# Patient Record
Sex: FEMALE | Race: WHITE | HISPANIC OR LATINO | URBAN - METROPOLITAN AREA
[De-identification: names, ages, dates, MRNs, and addresses within clinical notes are randomized per-mention and may not be internally consistent; named-entity substitution may affect disease eponyms.]

---

## 2017-04-19 ENCOUNTER — OUTPATIENT (OUTPATIENT)
Dept: OUTPATIENT SERVICES | Facility: HOSPITAL | Age: 57
LOS: 1 days | End: 2017-04-19
Payer: COMMERCIAL

## 2017-04-19 VITALS
RESPIRATION RATE: 16 BRPM | HEART RATE: 82 BPM | SYSTOLIC BLOOD PRESSURE: 168 MMHG | WEIGHT: 263.89 LBS | DIASTOLIC BLOOD PRESSURE: 87 MMHG | HEIGHT: 66 IN | TEMPERATURE: 98 F

## 2017-04-19 DIAGNOSIS — Z98.890 OTHER SPECIFIED POSTPROCEDURAL STATES: Chronic | ICD-10-CM

## 2017-04-19 DIAGNOSIS — N85.00 ENDOMETRIAL HYPERPLASIA, UNSPECIFIED: ICD-10-CM

## 2017-04-19 DIAGNOSIS — Z90.49 ACQUIRED ABSENCE OF OTHER SPECIFIED PARTS OF DIGESTIVE TRACT: Chronic | ICD-10-CM

## 2017-04-19 DIAGNOSIS — Z41.9 ENCOUNTER FOR PROCEDURE FOR PURPOSES OTHER THAN REMEDYING HEALTH STATE, UNSPECIFIED: Chronic | ICD-10-CM

## 2017-04-19 DIAGNOSIS — N95.0 POSTMENOPAUSAL BLEEDING: ICD-10-CM

## 2017-04-19 DIAGNOSIS — Z01.818 ENCOUNTER FOR OTHER PREPROCEDURAL EXAMINATION: ICD-10-CM

## 2017-04-19 DIAGNOSIS — Z98.89 OTHER SPECIFIED POSTPROCEDURAL STATES: Chronic | ICD-10-CM

## 2017-04-19 DIAGNOSIS — E11.9 TYPE 2 DIABETES MELLITUS WITHOUT COMPLICATIONS: ICD-10-CM

## 2017-04-19 LAB
ALBUMIN SERPL ELPH-MCNC: 2.2 G/DL — LOW (ref 3.3–5)
ALP SERPL-CCNC: 155 U/L — HIGH (ref 40–120)
ALT FLD-CCNC: 20 U/L — SIGNIFICANT CHANGE UP (ref 12–78)
ANION GAP SERPL CALC-SCNC: 8 MMOL/L — SIGNIFICANT CHANGE UP (ref 5–17)
AST SERPL-CCNC: 23 U/L — SIGNIFICANT CHANGE UP (ref 15–37)
BILIRUB SERPL-MCNC: 0.2 MG/DL — SIGNIFICANT CHANGE UP (ref 0.2–1.2)
BUN SERPL-MCNC: 23 MG/DL — SIGNIFICANT CHANGE UP (ref 7–23)
CALCIUM SERPL-MCNC: 8.3 MG/DL — LOW (ref 8.5–10.1)
CHLORIDE SERPL-SCNC: 109 MMOL/L — HIGH (ref 96–108)
CO2 SERPL-SCNC: 25 MMOL/L — SIGNIFICANT CHANGE UP (ref 22–31)
CREAT SERPL-MCNC: 1.1 MG/DL — SIGNIFICANT CHANGE UP (ref 0.5–1.3)
GLUCOSE SERPL-MCNC: 178 MG/DL — HIGH (ref 70–99)
HCT VFR BLD CALC: 38.6 % — SIGNIFICANT CHANGE UP (ref 34.5–45)
HGB BLD-MCNC: 11.8 G/DL — SIGNIFICANT CHANGE UP (ref 11.5–15.5)
MCHC RBC-ENTMCNC: 25.4 PG — LOW (ref 27–34)
MCHC RBC-ENTMCNC: 30.5 GM/DL — LOW (ref 32–36)
MCV RBC AUTO: 83.4 FL — SIGNIFICANT CHANGE UP (ref 80–100)
PLATELET # BLD AUTO: 307 K/UL — SIGNIFICANT CHANGE UP (ref 150–400)
POTASSIUM SERPL-MCNC: 4.4 MMOL/L — SIGNIFICANT CHANGE UP (ref 3.5–5.3)
POTASSIUM SERPL-SCNC: 4.4 MMOL/L — SIGNIFICANT CHANGE UP (ref 3.5–5.3)
PROT SERPL-MCNC: 6.2 G/DL — SIGNIFICANT CHANGE UP (ref 6–8.3)
RBC # BLD: 4.62 M/UL — SIGNIFICANT CHANGE UP (ref 3.8–5.2)
RBC # FLD: 13.6 % — SIGNIFICANT CHANGE UP (ref 10.3–14.5)
SODIUM SERPL-SCNC: 142 MMOL/L — SIGNIFICANT CHANGE UP (ref 135–145)
WBC # BLD: 7.8 K/UL — SIGNIFICANT CHANGE UP (ref 3.8–10.5)
WBC # FLD AUTO: 7.8 K/UL — SIGNIFICANT CHANGE UP (ref 3.8–10.5)

## 2017-04-19 PROCEDURE — G0463: CPT

## 2017-04-19 PROCEDURE — 80053 COMPREHEN METABOLIC PANEL: CPT

## 2017-04-19 PROCEDURE — 86901 BLOOD TYPING SEROLOGIC RH(D): CPT

## 2017-04-19 PROCEDURE — 83036 HEMOGLOBIN GLYCOSYLATED A1C: CPT

## 2017-04-19 PROCEDURE — 93005 ELECTROCARDIOGRAM TRACING: CPT

## 2017-04-19 PROCEDURE — 86850 RBC ANTIBODY SCREEN: CPT

## 2017-04-19 PROCEDURE — 86900 BLOOD TYPING SEROLOGIC ABO: CPT

## 2017-04-19 PROCEDURE — 93010 ELECTROCARDIOGRAM REPORT: CPT | Mod: NC

## 2017-04-19 PROCEDURE — 85027 COMPLETE CBC AUTOMATED: CPT

## 2017-04-19 RX ORDER — DEXTROSE 50 % IN WATER 50 %
25 SYRINGE (ML) INTRAVENOUS ONCE
Qty: 0 | Refills: 0 | Status: DISCONTINUED | OUTPATIENT
Start: 2017-04-28 | End: 2017-05-13

## 2017-04-19 RX ORDER — DEXTROSE 50 % IN WATER 50 %
1 SYRINGE (ML) INTRAVENOUS ONCE
Qty: 0 | Refills: 0 | Status: DISCONTINUED | OUTPATIENT
Start: 2017-04-28 | End: 2017-05-13

## 2017-04-19 RX ORDER — DEXTROSE 50 % IN WATER 50 %
12.5 SYRINGE (ML) INTRAVENOUS ONCE
Qty: 0 | Refills: 0 | Status: DISCONTINUED | OUTPATIENT
Start: 2017-04-28 | End: 2017-05-13

## 2017-04-19 RX ORDER — GLUCAGON INJECTION, SOLUTION 0.5 MG/.1ML
1 INJECTION, SOLUTION SUBCUTANEOUS ONCE
Qty: 0 | Refills: 0 | Status: DISCONTINUED | OUTPATIENT
Start: 2017-04-28 | End: 2017-05-13

## 2017-04-19 RX ORDER — SODIUM CHLORIDE 9 MG/ML
1000 INJECTION, SOLUTION INTRAVENOUS
Qty: 0 | Refills: 0 | Status: DISCONTINUED | OUTPATIENT
Start: 2017-04-28 | End: 2017-05-13

## 2017-04-19 NOTE — H&P PST ADULT - PMH
Endometrial hyperplasia, unspecified    Postmenopausal bleeding    Type 2 diabetes mellitus Endometrial hyperplasia, unspecified    Hypertension    Postmenopausal bleeding    Type 2 diabetes mellitus

## 2017-04-19 NOTE — H&P PST ADULT - PROBLEM SELECTOR PLAN 3
Pt does not have Endocrine Doctor - notes Dr Sylvester manages her Diabetes - notes Diagnosed about 15 years ago - does not know her A1C - notes has never known her A1C - does note she checks AM blood glucose levels and generally runs between 120-140 - Diabetes Wellness info given to pt for review - explained to pt she may need to see Endo prior to procedure if A1C > 9 - pt instructed to HOLD Metformin night prior to procedure on 4/27 as well as to HOLD Metformin & Glimepride morning of procedure on 4/28/17 - pt verbalizes understanding of all instructions

## 2017-04-19 NOTE — H&P PST ADULT - HISTORY OF PRESENT ILLNESS
56 year old female presents for PST prior to  Dilation & Curettage Hysteroscopy with Dr Garsia on 4/28/17 - pt notes 56 year old female   presents for PST prior to  Dilation & Curettage Hysteroscopy with Dr Garsia on 17 - pt notes she had stopped menses about 10 years ago however recently she developed some postmenopausal bleeding which lasted a few days. Pt notes she had Transvaginal Sonogram done in the office with endometrial biopsy attempted 2 times and pt notes "she couldn't get enough so I have to have this procedure." Pt is electing to scheduled procedure.

## 2017-04-19 NOTE — H&P PST ADULT - ASSESSMENT
56 year old female with Postmenopausal Bleeding and Endometrial Hyperplasia, Unspecified - scheduled for Dilation & Curettage Hysteroscopy with Dr Garsia on 4/28/17

## 2017-04-19 NOTE — H&P PST ADULT - NEGATIVE ENMT SYMPTOMS
no sinus symptoms/no hearing difficulty/no nasal discharge/no vertigo/no nasal obstruction/no nasal congestion

## 2017-04-19 NOTE — H&P PST ADULT - PSH
Elective surgery  Ovarian Cystectomy  H/O colonoscopy    H/O endoscopy    S/P laparoscopic cholecystectomy

## 2017-04-20 LAB — HBA1C BLD-MCNC: 12.2 % — HIGH (ref 4–5.6)

## 2017-04-27 ENCOUNTER — RESULT REVIEW (OUTPATIENT)
Age: 57
End: 2017-04-27

## 2017-04-27 RX ORDER — SODIUM CHLORIDE 9 MG/ML
1000 INJECTION, SOLUTION INTRAVENOUS
Qty: 0 | Refills: 0 | Status: DISCONTINUED | OUTPATIENT
Start: 2017-04-28 | End: 2017-05-13

## 2017-04-27 RX ORDER — ONDANSETRON 8 MG/1
4 TABLET, FILM COATED ORAL ONCE
Qty: 0 | Refills: 0 | Status: DISCONTINUED | OUTPATIENT
Start: 2017-04-28 | End: 2017-05-13

## 2017-04-28 ENCOUNTER — OUTPATIENT (OUTPATIENT)
Dept: OUTPATIENT SERVICES | Facility: HOSPITAL | Age: 57
LOS: 1 days | Discharge: HOME | End: 2017-04-28
Payer: COMMERCIAL

## 2017-04-28 ENCOUNTER — TRANSCRIPTION ENCOUNTER (OUTPATIENT)
Age: 57
End: 2017-04-28

## 2017-04-28 VITALS
OXYGEN SATURATION: 95 % | DIASTOLIC BLOOD PRESSURE: 81 MMHG | SYSTOLIC BLOOD PRESSURE: 145 MMHG | RESPIRATION RATE: 17 BRPM | HEART RATE: 70 BPM

## 2017-04-28 VITALS
RESPIRATION RATE: 21 BRPM | DIASTOLIC BLOOD PRESSURE: 74 MMHG | TEMPERATURE: 98 F | WEIGHT: 263.89 LBS | HEIGHT: 66 IN | SYSTOLIC BLOOD PRESSURE: 151 MMHG | HEART RATE: 81 BPM | OXYGEN SATURATION: 96 %

## 2017-04-28 DIAGNOSIS — Z41.9 ENCOUNTER FOR PROCEDURE FOR PURPOSES OTHER THAN REMEDYING HEALTH STATE, UNSPECIFIED: Chronic | ICD-10-CM

## 2017-04-28 DIAGNOSIS — Z90.49 ACQUIRED ABSENCE OF OTHER SPECIFIED PARTS OF DIGESTIVE TRACT: Chronic | ICD-10-CM

## 2017-04-28 DIAGNOSIS — N95.0 POSTMENOPAUSAL BLEEDING: ICD-10-CM

## 2017-04-28 DIAGNOSIS — Z98.89 OTHER SPECIFIED POSTPROCEDURAL STATES: Chronic | ICD-10-CM

## 2017-04-28 DIAGNOSIS — Z98.890 OTHER SPECIFIED POSTPROCEDURAL STATES: Chronic | ICD-10-CM

## 2017-04-28 DIAGNOSIS — N85.00 ENDOMETRIAL HYPERPLASIA, UNSPECIFIED: ICD-10-CM

## 2017-04-28 DIAGNOSIS — Z01.818 ENCOUNTER FOR OTHER PREPROCEDURAL EXAMINATION: ICD-10-CM

## 2017-04-28 PROCEDURE — 58558 HYSTEROSCOPY BIOPSY: CPT

## 2017-04-28 PROCEDURE — 88305 TISSUE EXAM BY PATHOLOGIST: CPT

## 2017-04-28 PROCEDURE — 88305 TISSUE EXAM BY PATHOLOGIST: CPT | Mod: 26

## 2017-04-28 RX ORDER — HYDROMORPHONE HYDROCHLORIDE 2 MG/ML
0.5 INJECTION INTRAMUSCULAR; INTRAVENOUS; SUBCUTANEOUS
Qty: 0 | Refills: 0 | Status: DISCONTINUED | OUTPATIENT
Start: 2017-04-28 | End: 2017-04-28

## 2017-04-28 RX ORDER — SODIUM CHLORIDE 9 MG/ML
1000 INJECTION, SOLUTION INTRAVENOUS
Qty: 0 | Refills: 0 | Status: DISCONTINUED | OUTPATIENT
Start: 2017-04-28 | End: 2017-04-28

## 2017-04-28 RX ORDER — OXYCODONE HYDROCHLORIDE 5 MG/1
5 TABLET ORAL ONCE
Qty: 0 | Refills: 0 | Status: DISCONTINUED | OUTPATIENT
Start: 2017-04-28 | End: 2017-04-28

## 2017-04-28 RX ORDER — ACETAMINOPHEN 500 MG
650 TABLET ORAL ONCE
Qty: 0 | Refills: 0 | Status: COMPLETED | OUTPATIENT
Start: 2017-04-28 | End: 2017-04-28

## 2017-04-28 RX ADMIN — SODIUM CHLORIDE 75 MILLILITER(S): 9 INJECTION, SOLUTION INTRAVENOUS at 07:38

## 2017-04-28 RX ADMIN — Medication 650 MILLIGRAM(S): at 07:37

## 2017-04-28 RX ADMIN — HYDROMORPHONE HYDROCHLORIDE 0.5 MILLIGRAM(S): 2 INJECTION INTRAMUSCULAR; INTRAVENOUS; SUBCUTANEOUS at 11:07

## 2017-04-28 RX ADMIN — HYDROMORPHONE HYDROCHLORIDE 0.5 MILLIGRAM(S): 2 INJECTION INTRAMUSCULAR; INTRAVENOUS; SUBCUTANEOUS at 10:47

## 2017-04-28 RX ADMIN — SODIUM CHLORIDE 75 MILLILITER(S): 9 INJECTION, SOLUTION INTRAVENOUS at 10:48

## 2017-04-28 NOTE — ASU DISCHARGE PLAN (ADULT/PEDIATRIC). - ACTIVITY LEVEL
no sports/gym/no heavy lifting/no intercourse/no tampons/no douching/nothing per vagina/no tub baths/no exercise

## 2017-04-28 NOTE — ASU DISCHARGE PLAN (ADULT/PEDIATRIC). - MEDICATION SUMMARY - MEDICATIONS TO TAKE
I will START or STAY ON the medications listed below when I get home from the hospital:    Motrin 600 mg oral tablet  -- 1 tab(s) by mouth every 8 hours x 3 days   -- Indication: For Pain med    glimepiride 4 mg oral tablet  -- 1 tab(s) by mouth once a day - IN AM  -- Indication: For home med    metFORMIN 500 mg oral tablet  -- 1 tab(s) by mouth 2 times a day  -- Indication: For home med    metoprolol succinate 25 mg oral tablet, extended release  -- 1 tab(s) by mouth once a day -IN AM  -- Indication: For home med    amLODIPine 10 mg oral tablet  -- 1 tab(s) by mouth once a day - IN AM  -- Indication: For HOME MED

## 2017-04-28 NOTE — ASU DISCHARGE PLAN (ADULT/PEDIATRIC). - NOTIFY
Bleeding that does not stop/Pain not relieved by Medications/Fever greater than 101/Persistent Nausea and Vomiting/Unable to Urinate

## 2017-04-28 NOTE — ASU PATIENT PROFILE, ADULT - PMH
Endometrial hyperplasia, unspecified    Hypertension    Postmenopausal bleeding    Type 2 diabetes mellitus

## 2017-04-28 NOTE — BRIEF OPERATIVE NOTE - ASSISTANT(S)
No intercourse/douching/tampons for 6 weeks. Avoid strenuous activity, exercise, heavy lifting for two weeks. You may shower freely, but do not soak in the tub or swim. Eat according to your appetite. Please follow all written and verbal instructions, and call us if you are having any acute problems, including (but not limited to) excessive vaginal bleeding (soaking pads), fever >100 degrees, persistent nausea or vomiting,  No Heavy lifting/straining, Do not drive or operate machinery, Showering allowed, Walking-Indoors allowed, Walking-Outdoors allowed, Stairs allowed

## 2017-05-03 DIAGNOSIS — E11.9 TYPE 2 DIABETES MELLITUS WITHOUT COMPLICATIONS: ICD-10-CM

## 2017-05-03 DIAGNOSIS — E66.9 OBESITY, UNSPECIFIED: ICD-10-CM

## 2017-05-03 DIAGNOSIS — I10 ESSENTIAL (PRIMARY) HYPERTENSION: ICD-10-CM

## 2017-05-03 DIAGNOSIS — Z88.0 ALLERGY STATUS TO PENICILLIN: ICD-10-CM

## 2017-05-03 DIAGNOSIS — N88.2 STRICTURE AND STENOSIS OF CERVIX UTERI: ICD-10-CM

## 2017-05-03 DIAGNOSIS — N95.0 POSTMENOPAUSAL BLEEDING: ICD-10-CM

## 2017-05-03 DIAGNOSIS — N84.0 POLYP OF CORPUS UTERI: ICD-10-CM

## 2017-05-03 DIAGNOSIS — R93.8 ABNORMAL FINDINGS ON DIAGNOSTIC IMAGING OF OTHER SPECIFIED BODY STRUCTURES: ICD-10-CM

## 2017-06-25 ENCOUNTER — INPATIENT (INPATIENT)
Facility: HOSPITAL | Age: 57
LOS: 1 days | Discharge: HOME | DRG: 638 | End: 2017-06-27
Attending: FAMILY MEDICINE | Admitting: FAMILY MEDICINE
Payer: COMMERCIAL

## 2017-06-25 VITALS
OXYGEN SATURATION: 96 % | WEIGHT: 259.93 LBS | TEMPERATURE: 98 F | RESPIRATION RATE: 16 BRPM | SYSTOLIC BLOOD PRESSURE: 164 MMHG | HEART RATE: 91 BPM | DIASTOLIC BLOOD PRESSURE: 80 MMHG | HEIGHT: 66 IN

## 2017-06-25 DIAGNOSIS — Z90.49 ACQUIRED ABSENCE OF OTHER SPECIFIED PARTS OF DIGESTIVE TRACT: Chronic | ICD-10-CM

## 2017-06-25 DIAGNOSIS — Z98.890 OTHER SPECIFIED POSTPROCEDURAL STATES: Chronic | ICD-10-CM

## 2017-06-25 DIAGNOSIS — E11.69 TYPE 2 DIABETES MELLITUS WITH OTHER SPECIFIED COMPLICATION: ICD-10-CM

## 2017-06-25 DIAGNOSIS — N18.2 CHRONIC KIDNEY DISEASE, STAGE 2 (MILD): ICD-10-CM

## 2017-06-25 DIAGNOSIS — Z98.89 OTHER SPECIFIED POSTPROCEDURAL STATES: Chronic | ICD-10-CM

## 2017-06-25 DIAGNOSIS — E11.9 TYPE 2 DIABETES MELLITUS WITHOUT COMPLICATIONS: ICD-10-CM

## 2017-06-25 DIAGNOSIS — Z41.9 ENCOUNTER FOR PROCEDURE FOR PURPOSES OTHER THAN REMEDYING HEALTH STATE, UNSPECIFIED: Chronic | ICD-10-CM

## 2017-06-25 DIAGNOSIS — I10 ESSENTIAL (PRIMARY) HYPERTENSION: ICD-10-CM

## 2017-06-25 LAB
ALBUMIN SERPL ELPH-MCNC: 1.9 G/DL — LOW (ref 3.3–5)
ALP SERPL-CCNC: 137 U/L — HIGH (ref 40–120)
ALT FLD-CCNC: 10 U/L — LOW (ref 12–78)
ANION GAP SERPL CALC-SCNC: 6 MMOL/L — SIGNIFICANT CHANGE UP (ref 5–17)
AST SERPL-CCNC: 15 U/L — SIGNIFICANT CHANGE UP (ref 15–37)
BASOPHILS # BLD AUTO: 0.1 K/UL — SIGNIFICANT CHANGE UP (ref 0–0.2)
BASOPHILS NFR BLD AUTO: 0.5 % — SIGNIFICANT CHANGE UP (ref 0–2)
BILIRUB SERPL-MCNC: 0.1 MG/DL — LOW (ref 0.2–1.2)
BUN SERPL-MCNC: 33 MG/DL — HIGH (ref 7–23)
CALCIUM SERPL-MCNC: 8.2 MG/DL — LOW (ref 8.5–10.1)
CHLORIDE SERPL-SCNC: 108 MMOL/L — SIGNIFICANT CHANGE UP (ref 96–108)
CO2 SERPL-SCNC: 25 MMOL/L — SIGNIFICANT CHANGE UP (ref 22–31)
CREAT SERPL-MCNC: 1.5 MG/DL — HIGH (ref 0.5–1.3)
EOSINOPHIL # BLD AUTO: 0.1 K/UL — SIGNIFICANT CHANGE UP (ref 0–0.5)
EOSINOPHIL NFR BLD AUTO: 1.1 % — SIGNIFICANT CHANGE UP (ref 0–6)
ERYTHROCYTE [SEDIMENTATION RATE] IN BLOOD: 102 MM/HR — HIGH (ref 0–20)
GLUCOSE SERPL-MCNC: 261 MG/DL — HIGH (ref 70–99)
HCT VFR BLD CALC: 34 % — LOW (ref 34.5–45)
HGB BLD-MCNC: 11 G/DL — LOW (ref 11.5–15.5)
LACTATE SERPL-SCNC: 1.1 MMOL/L — SIGNIFICANT CHANGE UP (ref 0.7–2)
LYMPHOCYTES # BLD AUTO: 2.8 K/UL — SIGNIFICANT CHANGE UP (ref 1–3.3)
LYMPHOCYTES # BLD AUTO: 23.6 % — SIGNIFICANT CHANGE UP (ref 13–44)
MCHC RBC-ENTMCNC: 26.2 PG — LOW (ref 27–34)
MCHC RBC-ENTMCNC: 32.3 GM/DL — SIGNIFICANT CHANGE UP (ref 32–36)
MCV RBC AUTO: 80.9 FL — SIGNIFICANT CHANGE UP (ref 80–100)
MONOCYTES # BLD AUTO: 0.7 K/UL — SIGNIFICANT CHANGE UP (ref 0–0.9)
MONOCYTES NFR BLD AUTO: 6.2 % — SIGNIFICANT CHANGE UP (ref 1–9)
NEUTROPHILS # BLD AUTO: 8.2 K/UL — HIGH (ref 1.8–7.4)
NEUTROPHILS NFR BLD AUTO: 68.6 % — SIGNIFICANT CHANGE UP (ref 43–77)
PLATELET # BLD AUTO: 333 K/UL — SIGNIFICANT CHANGE UP (ref 150–400)
POTASSIUM SERPL-MCNC: 5.2 MMOL/L — SIGNIFICANT CHANGE UP (ref 3.5–5.3)
POTASSIUM SERPL-SCNC: 5.2 MMOL/L — SIGNIFICANT CHANGE UP (ref 3.5–5.3)
PROT SERPL-MCNC: 6.5 G/DL — SIGNIFICANT CHANGE UP (ref 6–8.3)
RBC # BLD: 4.2 M/UL — SIGNIFICANT CHANGE UP (ref 3.8–5.2)
RBC # FLD: 13.4 % — SIGNIFICANT CHANGE UP (ref 10.3–14.5)
SODIUM SERPL-SCNC: 139 MMOL/L — SIGNIFICANT CHANGE UP (ref 135–145)
WBC # BLD: 12 K/UL — HIGH (ref 3.8–10.5)
WBC # FLD AUTO: 12 K/UL — HIGH (ref 3.8–10.5)

## 2017-06-25 PROCEDURE — 99285 EMERGENCY DEPT VISIT HI MDM: CPT

## 2017-06-25 PROCEDURE — 71010: CPT | Mod: 26

## 2017-06-25 PROCEDURE — 73630 X-RAY EXAM OF FOOT: CPT | Mod: 26,RT

## 2017-06-25 PROCEDURE — 93010 ELECTROCARDIOGRAM REPORT: CPT

## 2017-06-25 RX ORDER — METFORMIN HYDROCHLORIDE 850 MG/1
1 TABLET ORAL
Qty: 0 | Refills: 0 | COMMUNITY

## 2017-06-25 RX ORDER — SODIUM CHLORIDE 9 MG/ML
1000 INJECTION, SOLUTION INTRAVENOUS
Qty: 0 | Refills: 0 | Status: DISCONTINUED | OUTPATIENT
Start: 2017-06-25 | End: 2017-06-27

## 2017-06-25 RX ORDER — AMLODIPINE BESYLATE 2.5 MG/1
10 TABLET ORAL DAILY
Qty: 0 | Refills: 0 | Status: DISCONTINUED | OUTPATIENT
Start: 2017-06-25 | End: 2017-06-27

## 2017-06-25 RX ORDER — HEPARIN SODIUM 5000 [USP'U]/ML
5000 INJECTION INTRAVENOUS; SUBCUTANEOUS EVERY 12 HOURS
Qty: 0 | Refills: 0 | Status: DISCONTINUED | OUTPATIENT
Start: 2017-06-25 | End: 2017-06-27

## 2017-06-25 RX ORDER — ACETAMINOPHEN 500 MG
650 TABLET ORAL EVERY 6 HOURS
Qty: 0 | Refills: 0 | Status: DISCONTINUED | OUTPATIENT
Start: 2017-06-25 | End: 2017-06-27

## 2017-06-25 RX ORDER — INSULIN LISPRO 100/ML
VIAL (ML) SUBCUTANEOUS AT BEDTIME
Qty: 0 | Refills: 0 | Status: DISCONTINUED | OUTPATIENT
Start: 2017-06-25 | End: 2017-06-27

## 2017-06-25 RX ORDER — DEXTROSE 50 % IN WATER 50 %
1 SYRINGE (ML) INTRAVENOUS ONCE
Qty: 0 | Refills: 0 | Status: DISCONTINUED | OUTPATIENT
Start: 2017-06-25 | End: 2017-06-27

## 2017-06-25 RX ORDER — DEXTROSE 50 % IN WATER 50 %
25 SYRINGE (ML) INTRAVENOUS ONCE
Qty: 0 | Refills: 0 | Status: DISCONTINUED | OUTPATIENT
Start: 2017-06-25 | End: 2017-06-27

## 2017-06-25 RX ORDER — INSULIN LISPRO 100/ML
VIAL (ML) SUBCUTANEOUS
Qty: 0 | Refills: 0 | Status: DISCONTINUED | OUTPATIENT
Start: 2017-06-25 | End: 2017-06-27

## 2017-06-25 RX ORDER — GLUCAGON INJECTION, SOLUTION 0.5 MG/.1ML
1 INJECTION, SOLUTION SUBCUTANEOUS ONCE
Qty: 0 | Refills: 0 | Status: DISCONTINUED | OUTPATIENT
Start: 2017-06-25 | End: 2017-06-27

## 2017-06-25 RX ORDER — SODIUM CHLORIDE 9 MG/ML
1000 INJECTION INTRAMUSCULAR; INTRAVENOUS; SUBCUTANEOUS ONCE
Qty: 0 | Refills: 0 | Status: COMPLETED | OUTPATIENT
Start: 2017-06-25 | End: 2017-06-25

## 2017-06-25 RX ORDER — METOPROLOL TARTRATE 50 MG
25 TABLET ORAL DAILY
Qty: 0 | Refills: 0 | Status: DISCONTINUED | OUTPATIENT
Start: 2017-06-25 | End: 2017-06-27

## 2017-06-25 RX ORDER — SODIUM CHLORIDE 9 MG/ML
1000 INJECTION INTRAMUSCULAR; INTRAVENOUS; SUBCUTANEOUS
Qty: 0 | Refills: 0 | Status: COMPLETED | OUTPATIENT
Start: 2017-06-25 | End: 2017-06-26

## 2017-06-25 RX ORDER — VANCOMYCIN HCL 1 G
1250 VIAL (EA) INTRAVENOUS EVERY 12 HOURS
Qty: 0 | Refills: 0 | Status: DISCONTINUED | OUTPATIENT
Start: 2017-06-25 | End: 2017-06-27

## 2017-06-25 RX ORDER — DEXTROSE 50 % IN WATER 50 %
12.5 SYRINGE (ML) INTRAVENOUS ONCE
Qty: 0 | Refills: 0 | Status: DISCONTINUED | OUTPATIENT
Start: 2017-06-25 | End: 2017-06-27

## 2017-06-25 RX ADMIN — SODIUM CHLORIDE 2000 MILLILITER(S): 9 INJECTION INTRAMUSCULAR; INTRAVENOUS; SUBCUTANEOUS at 19:07

## 2017-06-25 RX ADMIN — Medication 166.67 MILLIGRAM(S): at 21:31

## 2017-06-25 RX ADMIN — Medication 100 MILLIGRAM(S): at 19:13

## 2017-06-25 NOTE — H&P ADULT - EXTREMITIES COMMENTS
Derm:  Right plantar hallux ulcer measures approximately 2cmx2.2cmx0.2cm. Wound base with necrotic eschar. No drainage. No fluctuance. No mal odor.  No open lesions to the left foot  Vasc: Pedal pulses PT/DP palpable bilateral. CFTs are less than 3 seconds to all digits. Skin temperature warm to warm from proximal to distal.  Neuro: Epicritic and Proprioceptive sensation diminish  B/L.  Msk: Muscle strength 5/5 B/L. Tenderness upon palpation to the right great hallux.

## 2017-06-25 NOTE — ED ADULT NURSE NOTE - OBJECTIVE STATEMENT
patient with ecchymosis, pain and swelling to right great toe, wound to bottom on right great to with scab in place.

## 2017-06-25 NOTE — ED PROVIDER NOTE - OBJECTIVE STATEMENT
Pt is a 55 yo female hx niddm.  Pt cut skin on left great toe 2 weeks ago and since with progressive redness and swelling of toe.  pt states nnow with dark area on bottom of toe and some pus draining.  Pt denies pain, fever or chills. No other sx. pt denies prior foot infections. Pt is a 57 yo female hx niddm.  Pt cut skin on right great toe 2 weeks ago and since with progressive redness and swelling of toe.  pt states now with dark area on bottom of toe and some pus draining.  Pt denies pain, fever or chills. No other sx. pt denies prior foot infections.

## 2017-06-25 NOTE — ED PROVIDER NOTE - PSH
Elective surgery  Ovarian Cystectomy  H/O colonoscopy    H/O endoscopy    S/P cholecystectomy    S/P laparoscopic cholecystectomy    S/P ovarian cystectomy

## 2017-06-25 NOTE — ED PROVIDER NOTE - SKIN, MLM
Skin normal color for race, left foot with cellulitis to 1/3 up foot, with great toe swollen and red/calloused with dry gangrene on plantar surface 2cm 2. Skin normal color for race, right foot with cellulitis to 1/3 up foot, with great toe swollen and red/calloused with dry gangrene on plantar surface 2cm 2.

## 2017-06-25 NOTE — ED ADULT NURSE NOTE - PMH
Diabetes    Endometrial hyperplasia, unspecified    Hypertension    Postmenopausal bleeding    Type 2 diabetes mellitus

## 2017-06-25 NOTE — ED PROVIDER NOTE - MEDICAL DECISION MAKING DETAILS
Pt is a 57 yo diabetic with foot cellulitis and gangrene of toe.  abx, culture, check xray, crp, esr.  admit med for wound care consult in am.

## 2017-06-25 NOTE — H&P ADULT - HISTORY OF PRESENT ILLNESS
Pt is a 55 yo female hx niddm who cut skin on right great toe 2 weeks ago and since with progressive redness and swelling of toe.  pt states now with dark area on bottom of toe and some pus draining.  Pt denies pain, fever or chills. No other sx. pt denies prior foot infections.

## 2017-06-26 LAB
ANION GAP SERPL CALC-SCNC: 6 MMOL/L — SIGNIFICANT CHANGE UP (ref 5–17)
BUN SERPL-MCNC: 28 MG/DL — HIGH (ref 7–23)
CALCIUM SERPL-MCNC: 8.1 MG/DL — LOW (ref 8.5–10.1)
CHLORIDE SERPL-SCNC: 108 MMOL/L — SIGNIFICANT CHANGE UP (ref 96–108)
CO2 SERPL-SCNC: 27 MMOL/L — SIGNIFICANT CHANGE UP (ref 22–31)
CREAT SERPL-MCNC: 1.2 MG/DL — SIGNIFICANT CHANGE UP (ref 0.5–1.3)
CRP SERPL-MCNC: 2.6 MG/DL — HIGH (ref 0–0.4)
GLUCOSE SERPL-MCNC: 182 MG/DL — HIGH (ref 70–99)
HCT VFR BLD CALC: 31 % — LOW (ref 34.5–45)
HGB BLD-MCNC: 10 G/DL — LOW (ref 11.5–15.5)
MAGNESIUM SERPL-MCNC: 2.5 MG/DL — SIGNIFICANT CHANGE UP (ref 1.6–2.6)
MCHC RBC-ENTMCNC: 26.5 PG — LOW (ref 27–34)
MCHC RBC-ENTMCNC: 32.4 GM/DL — SIGNIFICANT CHANGE UP (ref 32–36)
MCV RBC AUTO: 82 FL — SIGNIFICANT CHANGE UP (ref 80–100)
PLATELET # BLD AUTO: 316 K/UL — SIGNIFICANT CHANGE UP (ref 150–400)
POTASSIUM SERPL-MCNC: 4.5 MMOL/L — SIGNIFICANT CHANGE UP (ref 3.5–5.3)
POTASSIUM SERPL-SCNC: 4.5 MMOL/L — SIGNIFICANT CHANGE UP (ref 3.5–5.3)
RBC # BLD: 3.78 M/UL — LOW (ref 3.8–5.2)
RBC # FLD: 13.5 % — SIGNIFICANT CHANGE UP (ref 10.3–14.5)
SODIUM SERPL-SCNC: 141 MMOL/L — SIGNIFICANT CHANGE UP (ref 135–145)
WBC # BLD: 10.2 K/UL — SIGNIFICANT CHANGE UP (ref 3.8–10.5)
WBC # FLD AUTO: 10.2 K/UL — SIGNIFICANT CHANGE UP (ref 3.8–10.5)

## 2017-06-26 PROCEDURE — 73718 MRI LOWER EXTREMITY W/O DYE: CPT | Mod: 26,RT

## 2017-06-26 RX ADMIN — Medication 1: at 17:32

## 2017-06-26 RX ADMIN — Medication 25 MILLIGRAM(S): at 05:31

## 2017-06-26 RX ADMIN — HEPARIN SODIUM 5000 UNIT(S): 5000 INJECTION INTRAVENOUS; SUBCUTANEOUS at 05:31

## 2017-06-26 RX ADMIN — AMLODIPINE BESYLATE 10 MILLIGRAM(S): 2.5 TABLET ORAL at 05:31

## 2017-06-26 RX ADMIN — HEPARIN SODIUM 5000 UNIT(S): 5000 INJECTION INTRAVENOUS; SUBCUTANEOUS at 17:32

## 2017-06-26 RX ADMIN — Medication 1: at 08:32

## 2017-06-26 RX ADMIN — Medication 1: at 11:58

## 2017-06-26 RX ADMIN — Medication 166.67 MILLIGRAM(S): at 17:34

## 2017-06-26 RX ADMIN — Medication 166.67 MILLIGRAM(S): at 05:30

## 2017-06-26 RX ADMIN — SODIUM CHLORIDE 80 MILLILITER(S): 9 INJECTION INTRAMUSCULAR; INTRAVENOUS; SUBCUTANEOUS at 02:54

## 2017-06-26 NOTE — CONSULT NOTE ADULT - ASSESSMENT
G3 Diabetic Right Hallux ulceration    Plan   Patient seen and evaluated  f/u MRI of the Right hallux G3 Diabetic Right Hallux ulceration    Plan   Patient seen and evaluated  Debridement vs Surgical Intervention pending MRI  f/u MRI of the Right hallux

## 2017-06-26 NOTE — CONSULT NOTE ADULT - SUBJECTIVE AND OBJECTIVE BOX
History of Present Illness:  Chief Complaint/Reason for Admission: toe wound	  History of Present Illness: 	   Pt is a 55 yo female hx niddm who cut skin on right great toe 2 weeks ago and since with progressive redness and swelling of toe.  pt states now with dark area on bottom of toe and some pus draining.  Pt denies pain, fever or chills. No other sx. pt denies prior foot infections.    Review of Systems:  · General	negative	  · Ophthalmologic	negative	  · ENMT	negative	  · Respiratory and Thorax	negative	  · Cardiovascular	negative	  · Gastrointestinal	negative	  · Genitourinary	negative	  · Musculoskeletal Comments	right great toe wound	  · Neurological	negative	  · Hematology/Lymphatics	negative	  · Endocrine	negative	  · Allergic/Immunologic	negative	      Allergies and Intolerances:        Allergies:  	penicillins: Drug Category, Urticaria, Rash  	penicillin: Drug, Rash    Home Medications:   * Incomplete Medication History as of 25-Jun-2017 18:28 documented in Prescription Writer  · 	Motrin 600 mg oral tablet: 1 tab(s) orally every 8 hours x 3 days , Last Dose Taken: UNKNOWN UNKNOWN  · 	glimepiride 4 mg oral tablet: 1 tab(s) orally once a day - IN AM, Last Dose Taken: 25-Jun-2017   · 	metoprolol succinate 25 mg oral tablet, extended release: 1 tab(s) orally once a day -IN AM, Last Dose Taken: UNKNOWN UNKNOWN  · 	amLODIPine 10 mg oral tablet: 1 tab(s) orally once a day - IN AM, Last Dose Taken: UNKNOWN UNKNOWN  · 	metFORMIN 500 mg oral tablet: 2 tab(s) orally 2 times a day, Last Dose Taken: 25-Jun-2017     Patient History:   Past Medical History:  Diabetes    Endometrial hyperplasia, unspecified    Hypertension    Postmenopausal bleeding    Type 2 diabetes mellitus.    Past Surgical History:  Elective surgery  Ovarian Cystectomy  H/O colonoscopy    H/O endoscopy    S/P cholecystectomy    S/P laparoscopic cholecystectomy    S/P ovarian cystectomy.    Family History:  No pertinent family history in first degree relatives.    Focused Lower extremity exam:  Derm:  Right plantar hallux ulcer measures approximately 2cmx2.2cmx0.2cm. Wound base with necrotic eschar.   Vasc: Pedal pulses PT/DP palpable bilateral. CFTs are less than 3 seconds to all digits.  Neuro: Epicritic and Proprocetive sensation diminish  B/L.  Msk: Muscle strength 5/5 B/L. Tenderness upon palpation to the right great hallux. History of Present Illness:  Chief Complaint/Reason for Admission: toe wound	  History of Present Illness: 	   Pt is a 57 yo female hx niddm who cut skin on right great toe 2 weeks ago and since with progressive redness and swelling of toe.  pt states now with dark area on bottom of toe and some pus draining.  Pt denies pain, fever or chills. No other sx. pt denies prior foot infections.    Review of Systems:  · General	negative	  · Ophthalmologic	negative	  · ENMT	negative	  · Respiratory and Thorax	negative	  · Cardiovascular	negative	  · Gastrointestinal	negative	  · Genitourinary	negative	  · Musculoskeletal Comments	right great toe wound	  · Neurological	negative	  · Hematology/Lymphatics	negative	  · Endocrine	negative	  · Allergic/Immunologic	negative	      Allergies and Intolerances:        Allergies:  	penicillins: Drug Category, Urticaria, Rash  	penicillin: Drug, Rash    Home Medications:   * Incomplete Medication History as of 25-Jun-2017 18:28 documented in Prescription Writer  · 	Motrin 600 mg oral tablet: 1 tab(s) orally every 8 hours x 3 days , Last Dose Taken: UNKNOWN UNKNOWN  · 	glimepiride 4 mg oral tablet: 1 tab(s) orally once a day - IN AM, Last Dose Taken: 25-Jun-2017   · 	metoprolol succinate 25 mg oral tablet, extended release: 1 tab(s) orally once a day -IN AM, Last Dose Taken: UNKNOWN UNKNOWN  · 	amLODIPine 10 mg oral tablet: 1 tab(s) orally once a day - IN AM, Last Dose Taken: UNKNOWN UNKNOWN  · 	metFORMIN 500 mg oral tablet: 2 tab(s) orally 2 times a day, Last Dose Taken: 25-Jun-2017     Patient History:   Past Medical History:  Diabetes    Endometrial hyperplasia, unspecified    Hypertension    Postmenopausal bleeding    Type 2 diabetes mellitus.    Past Surgical History:  Elective surgery  Ovarian Cystectomy  H/O colonoscopy    H/O endoscopy    S/P cholecystectomy    S/P laparoscopic cholecystectomy    S/P ovarian cystectomy.    Family History:  No pertinent family history in first degree relatives.    Focused Lower extremity exam:  Derm:  Right plantar hallux ulcer measures approximately 2cmx2.2cmx0.2cm. Wound base with necrotic eschar. No drainage. No fluctuance. No mal odor.  No open lesions to the left foot  Vasc: Pedal pulses PT/DP palpable bilateral. CFTs are less than 3 seconds to all digits. Skin temperature warm to warm from proximal to distal.  Neuro: Epicritic and Proprioceptive sensation diminish  B/L.  Msk: Muscle strength 5/5 B/L. Tenderness upon palpation to the right great hallux.

## 2017-06-26 NOTE — PROGRESS NOTE ADULT - PROBLEM SELECTOR PLAN 1
- continue iv abx  - f/u cultures and sensitivities  - ID/wound care/podiatry consult  - MRI to r/o osteomyelitis

## 2017-06-26 NOTE — CONSULT NOTE ADULT - SUBJECTIVE AND OBJECTIVE BOX
HPI:  Pt is a 57 yo female hx uncontrolled DM who cut skin on right great toe 2 weeks ago   on a tile at a pool and since then had progressive redness and swelling of toe. Developed  eschar at bottom of toe.  Pt denies pain, fever or chills. NO n/v/diarrhea. Came to hospital   to be evaluated. WBC 12K. Has not seen a foot doctor. Also states DM control at home   is not good.    Infectious Disease consult was called to evaluate pt and for antibiotic management.      PAST MEDICAL & SURGICAL HISTORY:  Hypertension  Type 2 diabetes mellitus  Endometrial hyperplasia, unspecified  Postmenopausal bleeding  Diabetes  H/O endoscopy  H/O colonoscopy  Elective surgery: Ovarian Cystectomy  S/P laparoscopic cholecystectomy  S/P ovarian cystectomy  S/P cholecystectomy    Home Medications:   * Medication History as of 25-Jun-2017 18:28 documented in Prescription Writer  · 	Motrin 600 mg oral tablet: 1 tab(s) orally every 8 hours x 3 days , Last Dose Taken: UNKNOWN UNKNOWN  · 	glimepiride 4 mg oral tablet: 1 tab(s) orally once a day - IN AM, Last Dose Taken: 25-Jun-2017   · 	metoprolol succinate 25 mg oral tablet, extended release: 1 tab(s) orally once a day -IN AM, Last Dose Taken: UNKNOWN UNKNOWN  · 	amLODIPine 10 mg oral tablet: 1 tab(s) orally once a day - IN AM, Last Dose Taken: UNKNOWN UNKNOWN  · 	metFORMIN 500 mg oral tablet: 2 tab(s) orally 2 times a day, Last Dose Taken: 25-Jun-2017       Allergies  penicillin (Rash)  penicillins (Urticaria; Rash)      MEDICATIONS  (STANDING):  metoprolol succinate ER 25milliGRAM(s) Oral daily  amLODIPine   Tablet 10milliGRAM(s) Oral daily  insulin lispro (HumaLOG) corrective regimen sliding scale  SubCutaneous three times a day before meals  insulin lispro (HumaLOG) corrective regimen sliding scale  SubCutaneous at bedtime  dextrose 5%. 1000milliLiter(s) IV Continuous <Continuous>  dextrose 50% Injectable 12.5Gram(s) IV Push once  dextrose 50% Injectable 25Gram(s) IV Push once  dextrose 50% Injectable 25Gram(s) IV Push once  heparin  Injectable 5000Unit(s) SubCutaneous every 12 hours  vancomycin  IVPB 1250milliGRAM(s) IV Intermittent every 12 hours    MEDICATIONS  (PRN):  dextrose Gel 1Dose(s) Oral once PRN Blood Glucose LESS THAN 70 milliGRAM(s)/deciLiter  glucagon  Injectable 1milliGRAM(s) IntraMuscular once PRN Glucose <70 milliGRAM(s)/deciLiter  acetaminophen   Tablet 650milliGRAM(s) Oral every 6 hours PRN For Temp greater than 38 C (100.4 F)  acetaminophen   Tablet. 650milliGRAM(s) Oral every 6 hours PRN Mild Pain (1 - 3)      ANTIBIOTICS  vancomycin  IVPB 1250milliGRAM(s) IV Intermittent every 12 hours      SOCIAL HISTORY:  Does not smoke  Does not drink  Lives with family    FAMILY HISTORY:  No pertinent family history in first degree relatives    Drug Dosing Weight  Height (cm): 167.6 (25 Jun 2017 18:22)  Weight (kg): 123.2 (25 Jun 2017 22:02)  BMI (kg/m2): 43.9 (25 Jun 2017 22:02)  BSA (m2): 2.28 (25 Jun 2017 22:02)      REVIEW OF SYSTEMS:    CONSTITUTIONAL:  As per HPI.    HEENT:  Eyes:  No diplopia or blurred vision. ENT:  No earache, sore throat or runny nose.    CARDIOVASCULAR:  No pressure, squeezing, strangling, tightness, heaviness or aching about the chest, neck, axilla or epigastrium.    RESPIRATORY:  No cough, shortness of breath, PND or orthopnea.    GASTROINTESTINAL:  No nausea, vomiting or diarrhea.    GENITOURINARY:  No dysuria, frequency or urgency.    MUSCULOSKELETAL:  As per HPI. Right toe swelling and erythema    SKIN:  No change in skin, hair or nails.    NEUROLOGIC:  No paresthesias, fasciculations, seizures or weakness.    PSYCHIATRIC:  No disorder of thought or mood.    ENDOCRINE:  No heat or cold intolerance, polyuria or polydipsia.    HEMATOLOGICAL:  No easy bruising or bleeding.       Vital Signs Last 24 Hrs  T(C): 36.9, Max: 36.9 (06-26 @ 13:35)  T(F): 98.4, Max: 98.4 (06-26 @ 13:35)  HR: 79 (78 - 91)  BP: 150/82 (150/82 - 180/98)  BP(mean): --  RR: 16 (16 - 18)  SpO2: 96% (95% - 96%)    PHYSICAL EXAMINATION:    GENERAL: The patient is in no apparent distress. Alert and oriented x3.      HEENT: Head is normocephalic and atraumatic. Extraocular muscles are intact. Pupils are equal, round, and reactive to light and accommodation. Nares appeared normal. Mouth is well hydrated and without lesions. Mucous membranes are moist.    NECK: Supple. No carotid bruits.  No lymphadenopathy or thyromegaly.    LUNGS: Clear to auscultation.    HEART: Regular rate and rhythm without murmur.    ABDOMEN: Soft, nontender, and nondistended.  Positive bowel sounds.  No hepatosplenomegaly was noted.    EXTREMITIES: Without any cyanosis, clubbing, rash, lesions or edema. Right great toe mild swelling and redness, ulcer on bottom of great toe   no drainage no foul smell nontender no surrounding erythema    NEUROLOGIC: Cranial nerves II through XII are grossly intact.    SKIN: No ulceration or induration present.      LABS:                        10.0   10.2  )-----------( 316      ( 26 Jun 2017 07:51 )             31.0     06-26    141  |  108  |  28<H>  ----------------------------<  182<H>  4.5   |  27  |  1.20    Ca    8.1<L>      26 Jun 2017 07:51  Mg     2.5     06-26    TPro  6.5  /  Alb  1.9<L>  /  TBili  0.1<L>  /  DBili  x   /  AST  15  /  ALT  10<L>  /  AlkPhos  137<H>  06-25          RADIOLOGY & ADDITIONAL STUDIES:  EXAM:  MRI FOOT W O CONT RT                            PROCEDURE DATE:  06/26/2017        INTERPRETATION:  MRI FOOT W O CONT RT dated 6/26/2017 1:46 PM     INDICATION: Right hallux ulcer.    TECHNIQUE: Multi-sequential, multiplanar MRI imaging of the midfoot and   forefoot was performed per standard protocol.     COMPARISON: Foot radiographs dated 6/25/2017    FINDINGS: There is moderate to severe dorsal forefoot soft tissue   swelling which extends into the toes. There is moderate skin thickening   about the great toe. A skin ulceration over the plantar medial aspect of   the great toe measuring 1.5 x 1.5 cm. Deep to the skin ulcer, there is no   drainable fluid collection. The visualized portions of the tendons are   intact. No full-thickness tear or retraction is seen. There is no large   tenosynovitis. The distal plantar fascia is intact.    Evaluation of the bone marrow signal demonstrates no confluent decreased   T1 signal to suggest osteomyelitis. There is subtle marrow edema within   the first distal phalanx, nonspecific. No fracture or contusion is seen.   Mild spurring at the first metatarsal head noted. There is no large joint   effusion. The midfoot joint spaces are relatively preserved.    There is an intact Lisfranc ligament with normal alignment at the   tarsometatarsal joints.    There is no soft tissue mass. Neurovascular structures are preserved.   There is mild atrophy of the intrinsic musculature the foot with   associated edema.      IMPRESSION:   Severe soft tissue swelling about the foot with great toe skin ulcer.   Compatible with cellulitis. No drainable fluid collection.  No osteomyelitis. Subtle edema in the first distal phalanx without   abnormal T1 signal likely represents a mild reactive osteitis.      EXAM:  PORTABLE CHEST URGENT                            PROCEDURE DATE:  06/25/2017        INTERPRETATION:  Admission.    AP chest.    Heart and mediastinum normal.  Lungs clear.  Costophrenic angles sharp   bilaterally.    IMPRESSION: Normal chest        ASSESSMENT:  56 year old female uncontrolled DM obese admitted with right great toe ulcer with foot cellulitis  Appears to be responding to antibx  MRI with no obvious osteomyelitis  ELVIA resolved  Leukocytosis better  Clinically stable    PLAN:  Cont Vancomycin  Fu trough prior to 4th dose  No need for surgical intervention  If cont to improve will dc home on Minocycline 100 po bid x 14 days  Encourage better DM control and discuss with pt that she is high risk for loss of limb in the future  Need good outpt podiatry follow up

## 2017-06-27 VITALS — HEART RATE: 78 BPM

## 2017-06-27 LAB — VANCOMYCIN TROUGH SERPL-MCNC: 19.6 UG/ML — SIGNIFICANT CHANGE UP (ref 10–20)

## 2017-06-27 RX ORDER — MINOCYCLINE HYDROCHLORIDE 45 MG/1
1 TABLET, EXTENDED RELEASE ORAL
Qty: 28 | Refills: 0
Start: 2017-06-27 | End: 2017-07-11

## 2017-06-27 RX ORDER — IBUPROFEN 200 MG
1 TABLET ORAL
Qty: 0 | Refills: 0 | COMMUNITY

## 2017-06-27 RX ORDER — INSULIN GLARGINE 100 [IU]/ML
10 INJECTION, SOLUTION SUBCUTANEOUS EVERY MORNING
Qty: 0 | Refills: 0 | Status: DISCONTINUED | OUTPATIENT
Start: 2017-06-27 | End: 2017-06-27

## 2017-06-27 RX ORDER — INSULIN LISPRO 100/ML
VIAL (ML) SUBCUTANEOUS
Qty: 0 | Refills: 0 | Status: DISCONTINUED | OUTPATIENT
Start: 2017-06-27 | End: 2017-06-27

## 2017-06-27 RX ADMIN — AMLODIPINE BESYLATE 10 MILLIGRAM(S): 2.5 TABLET ORAL at 05:36

## 2017-06-27 RX ADMIN — INSULIN GLARGINE 10 UNIT(S): 100 INJECTION, SOLUTION SUBCUTANEOUS at 10:20

## 2017-06-27 RX ADMIN — Medication 6: at 11:50

## 2017-06-27 RX ADMIN — Medication 25 MILLIGRAM(S): at 05:36

## 2017-06-27 RX ADMIN — HEPARIN SODIUM 5000 UNIT(S): 5000 INJECTION INTRAVENOUS; SUBCUTANEOUS at 05:36

## 2017-06-27 RX ADMIN — Medication 1: at 08:36

## 2017-06-27 RX ADMIN — Medication 166.67 MILLIGRAM(S): at 05:35

## 2017-06-27 NOTE — DISCHARGE NOTE ADULT - HOSPITAL COURSE
Pt is a 55 yo female hx niddm who cut skin on right great toe 2 weeks ago and since with progressive redness and swelling of toe.  pt states now with dark area on bottom of toe and some pus draining.  Pt denies pain, fever or chills. No other sx. pt denies prior foot infections.    MRI negative for osteomyelitis. Patient was seen by wound care and ID. She small bedside debridement. Wound care recommends silver alginate with DSD daily and follow-up with wound care center within 1 week. Her hgA1c is 12.2. She was advised to follow-up with endocrinologist for medication adjustment this week. She was cleared by wound care and ID for d/c home with po minocin x 14d.

## 2017-06-27 NOTE — DISCHARGE NOTE ADULT - PROVIDER TOKENS
TOKEN:'[id number]',FREE:[LAST:[[facility]],PHONE:[phone]],FAX:[(   )    -]]

## 2017-06-27 NOTE — DISCHARGE NOTE ADULT - CARE PROVIDER_API CALL
Sha Sylvester (MD), Medicine  [address] [place] [address] [address] [place] [address]  Phone: [phone]  Fax: [phone]    [facility],   Phone: [phone]  Fax: (   )    -

## 2017-06-27 NOTE — CONSULT NOTE ADULT - SUBJECTIVE AND OBJECTIVE BOX
Patient is a 56y old  Female who presents with a chief complaint of toe wound (25 Jun 2017 22:38)      Reason For Consult:     HPI:  Pt is a 55 yo female hx niddm who cut skin on right great toe 2 weeks ago and since with progressive redness and swelling of toe.  pt states now with dark area on bottom of toe and some pus draining.  Pt denies pain, fever or chills. No other sx. pt denies prior foot infections. (25 Jun 2017 22:38)      PAST MEDICAL & SURGICAL HISTORY:  Hypertension  Type 2 diabetes mellitus  Endometrial hyperplasia, unspecified  Postmenopausal bleeding  Diabetes  H/O endoscopy  H/O colonoscopy  Elective surgery: Ovarian Cystectomy  S/P laparoscopic cholecystectomy  S/P ovarian cystectomy  S/P cholecystectomy      FAMILY HISTORY:  No pertinent family history in first degree relatives        Social History:    MEDICATIONS  (STANDING):  metoprolol succinate ER 25 milliGRAM(s) Oral daily  amLODIPine   Tablet 10 milliGRAM(s) Oral daily  insulin lispro (HumaLOG) corrective regimen sliding scale   SubCutaneous three times a day before meals  insulin lispro (HumaLOG) corrective regimen sliding scale   SubCutaneous at bedtime  dextrose 5%. 1000 milliLiter(s) (50 mL/Hr) IV Continuous <Continuous>  dextrose 50% Injectable 12.5 Gram(s) IV Push once  dextrose 50% Injectable 25 Gram(s) IV Push once  dextrose 50% Injectable 25 Gram(s) IV Push once  heparin  Injectable 5000 Unit(s) SubCutaneous every 12 hours  vancomycin  IVPB 1250 milliGRAM(s) IV Intermittent every 12 hours    MEDICATIONS  (PRN):  dextrose Gel 1 Dose(s) Oral once PRN Blood Glucose LESS THAN 70 milliGRAM(s)/deciLiter  glucagon  Injectable 1 milliGRAM(s) IntraMuscular once PRN Glucose <70 milliGRAM(s)/deciLiter  acetaminophen   Tablet 650 milliGRAM(s) Oral every 6 hours PRN For Temp greater than 38 C (100.4 F)  acetaminophen   Tablet. 650 milliGRAM(s) Oral every 6 hours PRN Mild Pain (1 - 3)        T(C): 36.5 (06-27-17 @ 05:13), Max: 36.9 (06-26-17 @ 13:35)  HR: 78 (06-27-17 @ 05:34) (59 - 82)  BP: 162/93 (06-27-17 @ 05:13) (145/84 - 162/93)  RR: 18 (06-27-17 @ 05:13) (16 - 18)  SpO2: 95% (06-27-17 @ 05:13) (95% - 96%)  Wt(kg): --    PHYSICAL EXAM:  GENERAL: NAD, well-groomed, well-developed  HEAD:  Atraumatic, Normocephalic  NECK: Supple, No JVD, Normal thyroid  CHEST/LUNG: Clear to percussion bilaterally; No rales, rhonchi, wheezing, or rubs  HEART: Regular rate and rhythm; No murmurs, rubs, or gallops  ABDOMEN: Soft, Nontender, Nondistended; Bowel sounds present  EXTREMITIES:  le dsg intact  SKIN: No rashes or lesions    CAPILLARY BLOOD GLUCOSE  179 (27 Jun 2017 08:03)  199 (26 Jun 2017 21:31)  176 (26 Jun 2017 16:45)  167 (26 Jun 2017 11:20)                                10.0   10.2  )-----------( 316      ( 26 Jun 2017 07:51 )             31.0       CMP:  06-26 @ 07:51  SGPT --  Albumin --   Alk Phos --   Anion Gap 6   SGOT --   Total Bili --   BUN 28   Calcium Total 8.1   CO2 27   Chloride 108   Creatinine 1.20   eGFR if AA 59   eGFR if non AA 50   Glucose 182   Potassium 4.5   Protein --   Sodium 141      Thyroid Function Tests:      Diabetes Tests:       Radiology:

## 2017-06-27 NOTE — DISCHARGE NOTE ADULT - ADDITIONAL INSTRUCTIONS
Follow-up with [facility] wound care [phone] within 1 week and your primary this week.

## 2017-06-27 NOTE — PROGRESS NOTE ADULT - ASSESSMENT
G3 Diabetic Right Hallux ulceration    Plan   Patient seen and evaluated  MRI reviewed  Bedside debridement of Right hallux  Silver Alginate, DSD    Pt is to follow up in C upon discharge G3 Diabetic Right Hallux ulceration    Plan   Patient seen and evaluated  MRI reviewed  local wound care with Silver Alginate, DSD  abx as per ID    Pt is to follow up in [facility] upon discharge

## 2017-06-27 NOTE — DISCHARGE NOTE ADULT - CARE PLAN
Principal Discharge DX:	Diabetic foot infection  Goal:	.  Instructions for follow-up, activity and diet:	Finish course of antibiotics. Follow-up with primary and wound care center within 1 week.  Secondary Diagnosis:	Diabetes  Instructions for follow-up, activity and diet:	Low calorie diet. Follow-up with endocrinologist this week.  Secondary Diagnosis:	Essential hypertension  Instructions for follow-up, activity and diet:	Continue current medications

## 2017-06-27 NOTE — PROGRESS NOTE ADULT - SUBJECTIVE AND OBJECTIVE BOX
CHIEF COMPLAINT:    SUBJECTIVE:  Patient seen during morning rounds for Right hallux ulceration. Pt states no pain to the right hallux. Pt denies any other pedal complaints.      Vital Signs Last 24 Hrs  T(C): 36.5 (27 Jun 2017 05:13), Max: 36.9 (26 Jun 2017 13:35)  T(F): 97.7 (27 Jun 2017 05:13), Max: 98.4 (26 Jun 2017 13:35)  HR: 78 (27 Jun 2017 05:34) (59 - 82)  BP: 162/93 (27 Jun 2017 05:13) (145/84 - 162/93)  BP(mean): --  RR: 18 (27 Jun 2017 05:13) (16 - 18)  SpO2: 95% (27 Jun 2017 05:13) (95% - 96%)    OBJECTIVE:   PHYSICAL EXAM:  Focused Lower Extremity Physical Examination:   General: NAD, A&Ox3, WDWN  Derm:  Right plantar hallux ulcer measures approximately 2cmx2.2cmx0.2cm. Wound base with necrotic eschar. No drainage. No fluctuance. No mal odor.  No open lesions to the left foot  Vasc: Pedal pulses PT/DP palpable bilateral. CFTs are less than 3 seconds to all digits. Skin temperature warm to warm from proximal to distal.  Neuro: Epicritic and Proprioceptive sensation diminish  B/L.  Msk: Muscle strength 5/5 B/L. Tenderness upon palpation to the right great hallux.      RADIOLOGY:  XAM:  MRI FOOT W O CONT RT                            PROCEDURE DATE:  06/26/2017        INTERPRETATION:  MRI FOOT W O CONT RT dated 6/26/2017 1:46 PM     INDICATION: Right hallux ulcer.    TECHNIQUE: Multi-sequential, multiplanar MRI imaging of the midfoot and   forefoot was performed per standard protocol.     COMPARISON: Foot radiographs dated 6/25/2017    FINDINGS: There is moderate to severe dorsal forefoot soft tissue   swelling which extends into the toes. There is moderate skin thickening   about the great toe. A skin ulceration over the plantar medial aspect of   the great toe measuring 1.5 x 1.5 cm. Deep to the skin ulcer, there is no   drainable fluid collection. The visualized portions of the tendons are   intact. No full-thickness tear or retraction is seen. There is no large   tenosynovitis. The distal plantar fascia is intact.    Evaluation of the bone marrow signal demonstrates no confluent decreased   T1 signal to suggest osteomyelitis. There is subtle marrow edema within   the first distal phalanx, nonspecific. No fracture or contusion is seen.   Mild spurring at the first metatarsal head noted. There is no large joint   effusion. The midfoot joint spaces are relatively preserved.    There is an intact Lisfranc ligament with normal alignment at the   tarsometatarsal joints.    There is no soft tissue mass. Neurovascular structures are preserved.   There is mild atrophy of the intrinsic musculature the foot with   associated edema.      IMPRESSION:   Severe soft tissue swelling about the foot with great toe skin ulcer.   Compatible with cellulitis. No drainable fluid collection.  No osteomyelitis. Subtle edema in the first distal phalanx without   abnormal T1 signal likely represents a mild reactive osteitis.        LABS:                          10.0   10.2  )-----------( 316      ( 26 Jun 2017 07:51 )             31.0       06-26    141  |  108  |  28<H>  ----------------------------<  182<H>  4.5   |  27  |  1.20    Ca    8.1<L>      26 Jun 2017 07:51  Mg     2.5     06-26    TPro  6.5  /  Alb  1.9<L>  /  TBili  0.1<L>  /  DBili  x   /  AST  15  /  ALT  10<L>  /  AlkPhos  137<H>  06-25            metoprolol succinate ER 25 milliGRAM(s) Oral daily  amLODIPine   Tablet 10 milliGRAM(s) Oral daily  insulin lispro (HumaLOG) corrective regimen sliding scale   SubCutaneous at bedtime  dextrose 5%. 1000 milliLiter(s) IV Continuous <Continuous>  dextrose Gel 1 Dose(s) Oral once PRN  dextrose 50% Injectable 12.5 Gram(s) IV Push once  dextrose 50% Injectable 25 Gram(s) IV Push once  dextrose 50% Injectable 25 Gram(s) IV Push once  glucagon  Injectable 1 milliGRAM(s) IntraMuscular once PRN  heparin  Injectable 5000 Unit(s) SubCutaneous every 12 hours  acetaminophen   Tablet 650 milliGRAM(s) Oral every 6 hours PRN  acetaminophen   Tablet. 650 milliGRAM(s) Oral every 6 hours PRN  vancomycin  IVPB 1250 milliGRAM(s) IV Intermittent every 12 hours  insulin glargine Injectable (LANTUS) 10 Unit(s) SubCutaneous every morning  insulin lispro (HumaLOG) corrective regimen sliding scale   SubCutaneous three times a day before meals
Patient is a 56y old  Female who presents with a chief complaint of toe wound (25 Jun 2017 22:38)      INTERVAL HPI/OVERNIGHT EVENTS: Patient seen and examined. NAD. No complaints.      Vital Signs Last 24 Hrs  T(C): 36.6, Max: 36.8 (06-25 @ 18:22)  T(F): 97.9, Max: 98.3 (06-25 @ 18:22)  HR: 78 (78 - 91)  BP: 158/76 (152/84 - 180/98)  BP(mean): --  RR: 17 (16 - 18)  SpO2: 96% (95% - 96%)I&O's Summary  I & Os for 24h ending 26 Jun 2017 07:00  =============================================  IN: 1220 ml / OUT: 0 ml / NET: 1220 ml    I & Os for current day (as of 26 Jun 2017 13:28)  =============================================  IN: 240 ml / OUT: 0 ml / NET: 240 ml      LABS:                        10.0   10.2  )-----------( 316      ( 26 Jun 2017 07:51 )             31.0     06-26    141  |  108  |  28<H>  ----------------------------<  182<H>  4.5   |  27  |  1.20    Ca    8.1<L>      26 Jun 2017 07:51  Mg     2.5     06-26    TPro  6.5  /  Alb  1.9<L>  /  TBili  0.1<L>  /  DBili  x   /  AST  15  /  ALT  10<L>  /  AlkPhos  137<H>  06-25        CAPILLARY BLOOD GLUCOSE  167 (26 Jun 2017 11:20)  188 (26 Jun 2017 07:27)  215 (25 Jun 2017 22:06)  205 (25 Jun 2017 21:04)  263 (25 Jun 2017 18:22)          metoprolol succinate ER 25milliGRAM(s) Oral daily  amLODIPine   Tablet 10milliGRAM(s) Oral daily  insulin lispro (HumaLOG) corrective regimen sliding scale  SubCutaneous three times a day before meals  insulin lispro (HumaLOG) corrective regimen sliding scale  SubCutaneous at bedtime  dextrose 5%. 1000milliLiter(s) IV Continuous <Continuous>  dextrose Gel 1Dose(s) Oral once PRN  dextrose 50% Injectable 12.5Gram(s) IV Push once  dextrose 50% Injectable 25Gram(s) IV Push once  dextrose 50% Injectable 25Gram(s) IV Push once  glucagon  Injectable 1milliGRAM(s) IntraMuscular once PRN  heparin  Injectable 5000Unit(s) SubCutaneous every 12 hours  acetaminophen   Tablet 650milliGRAM(s) Oral every 6 hours PRN  acetaminophen   Tablet. 650milliGRAM(s) Oral every 6 hours PRN  vancomycin  IVPB 1250milliGRAM(s) IV Intermittent every 12 hours      REVIEW OF SYSTEMS:  CONSTITUTIONAL: No fever, weight loss, or fatigue  NECK: No pain or stiffness  RESPIRATORY: No cough, wheezing, chills or hemoptysis; No shortness of breath  CARDIOVASCULAR: No chest pain, palpitations, dizziness, or leg swelling  GASTROINTESTINAL: No abdominal or epigastric pain. No nausea, vomiting, or hematemesis; No diarrhea or constipation. No melena or hematochezia.  GENITOURINARY: No dysuria, frequency, hematuria, or incontinence  NEUROLOGICAL: No headaches, loss of strength, numbness, or tremors  SKIN: No itching, burning  MUSCULOSKELETAL: No joint pain or swelling; No muscle, back, or extremity pain; right great toe wound  PSYCHIATRIC: No depression, mood swings, HEME/LYMPH: No easy bruising, or bleeding gums  ALLERY AND IMMUNOLOGIC: No hives     RADIOLOGY & ADDITIONAL TESTS:    Imaging Personally Reviewed:  [x ] YES  [ ] NO    Consultant(s) Notes Reviewed:  [ x] YES  [ ] NO    PHYSICAL EXAM:  GENERAL: NAD, well-groomed, well-developed  HEAD:  Atraumatic, Normocephalic  EYES: EOMI, PERRLA, conjunctiva and sclera clear  ENMT: No tonsillar erythema, exudates, or enlargement; Moist mucous membranes  NECK: Supple, No JVD  NERVOUS SYSTEM:  Awake & alert, Good concentration;   CHEST/LUNG: Clear to auscultation bilaterally; No rales, rhonchi, wheezing,  HEART: Regular rate and rhythm  ABDOMEN: Soft, Nontender, Nondistended; Bowel sounds present  EXTREMITIES:  No clubbing, cyanosis, or edema  Right great toe:  Derm:  Right plantar hallux ulcer measures approximately 2cmx2.2cmx0.2cm. Wound base with necrotic eschar. No drainage. No fluctuance. No mal odor.  No open lesions to the left foot  Vasc: Pedal pulses PT/DP palpable bilateral. CFTs are less than 3 seconds to all digits. Skin temperature warm to warm from proximal to distal.  Neuro: Epicritic and Proprioceptive sensation diminish  B/L.  Msk: Muscle strength 5/5 B/L. Tenderness upon palpation to the right great hallux.  LYMPH: No lymphadenopathy noted  SKIN: No rashes    Care Discussed with Consultants/Other Providers [x ] YES  [ ] NO    Advanced care planning discussed with patient/family [x ] YES   [ ] NO

## 2017-06-27 NOTE — DISCHARGE NOTE ADULT - PLAN OF CARE
. Finish course of antibiotics. Follow-up with primary and wound care center within 1 week. Low calorie diet. Follow-up with endocrinologist this week. Continue current medications

## 2017-06-27 NOTE — DISCHARGE NOTE ADULT - MEDICATION SUMMARY - MEDICATIONS TO TAKE
I will START or STAY ON the medications listed below when I get home from the hospital:    glimepiride 4 mg oral tablet  -- 1 tab(s) by mouth once a day - IN AM  -- Indication: For Diabetes    metFORMIN 500 mg oral tablet  -- 2 tab(s) by mouth 2 times a day  -- Indication: For Diabetes    metoprolol succinate 25 mg oral tablet, extended release  -- 1 tab(s) by mouth once a day -IN AM  -- Indication: For high bp    amLODIPine 10 mg oral tablet  -- 1 tab(s) by mouth once a day - IN AM  -- Indication: For high bp    minocycline 100 mg oral capsule  -- 1 cap(s) by mouth 2 times a day  -- Avoid prolonged or excessive exposure to direct and/or artificial sunlight while taking this medication.  Do not take this drug if you are pregnant.  Finish all this medication unless otherwise directed by prescriber.  May cause drowsiness or dizziness.  Medication should be taken with plenty of water.    -- Indication: For Diabetic foot infection

## 2017-06-27 NOTE — CONSULT NOTE ADULT - CONSULT REASON
Right great hallux
Right foot cellulitis with right hallux ulcer rule out osteomyelitis
dm2 uncontrolled

## 2017-06-29 DIAGNOSIS — E11.22 TYPE 2 DIABETES MELLITUS WITH DIABETIC CHRONIC KIDNEY DISEASE: ICD-10-CM

## 2017-06-29 DIAGNOSIS — L97.511 NON-PRESSURE CHRONIC ULCER OF OTHER PART OF RIGHT FOOT LIMITED TO BREAKDOWN OF SKIN: ICD-10-CM

## 2017-06-29 DIAGNOSIS — N18.2 CHRONIC KIDNEY DISEASE, STAGE 2 (MILD): ICD-10-CM

## 2017-06-29 DIAGNOSIS — E11.69 TYPE 2 DIABETES MELLITUS WITH OTHER SPECIFIED COMPLICATION: ICD-10-CM

## 2017-06-29 DIAGNOSIS — I12.9 HYPERTENSIVE CHRONIC KIDNEY DISEASE WITH STAGE 1 THROUGH STAGE 4 CHRONIC KIDNEY DISEASE, OR UNSPECIFIED CHRONIC KIDNEY DISEASE: ICD-10-CM

## 2017-06-29 DIAGNOSIS — L03.031 CELLULITIS OF RIGHT TOE: ICD-10-CM

## 2017-06-29 DIAGNOSIS — E11.621 TYPE 2 DIABETES MELLITUS WITH FOOT ULCER: ICD-10-CM

## 2017-06-29 DIAGNOSIS — E11.628 TYPE 2 DIABETES MELLITUS WITH OTHER SKIN COMPLICATIONS: ICD-10-CM

## 2017-06-29 DIAGNOSIS — Z88.0 ALLERGY STATUS TO PENICILLIN: ICD-10-CM

## 2017-06-29 DIAGNOSIS — Z79.84 LONG TERM (CURRENT) USE OF ORAL HYPOGLYCEMIC DRUGS: ICD-10-CM

## 2017-07-01 LAB
CULTURE RESULTS: SIGNIFICANT CHANGE UP
CULTURE RESULTS: SIGNIFICANT CHANGE UP
SPECIMEN SOURCE: SIGNIFICANT CHANGE UP
SPECIMEN SOURCE: SIGNIFICANT CHANGE UP

## 2017-07-02 DIAGNOSIS — E66.9 OBESITY, UNSPECIFIED: ICD-10-CM

## 2017-08-04 PROCEDURE — 96375 TX/PRO/DX INJ NEW DRUG ADDON: CPT

## 2017-08-04 PROCEDURE — 93005 ELECTROCARDIOGRAM TRACING: CPT

## 2017-08-04 PROCEDURE — 80202 ASSAY OF VANCOMYCIN: CPT

## 2017-08-04 PROCEDURE — 71045 X-RAY EXAM CHEST 1 VIEW: CPT

## 2017-08-04 PROCEDURE — 85652 RBC SED RATE AUTOMATED: CPT

## 2017-08-04 PROCEDURE — 73630 X-RAY EXAM OF FOOT: CPT

## 2017-08-04 PROCEDURE — 96376 TX/PRO/DX INJ SAME DRUG ADON: CPT

## 2017-08-04 PROCEDURE — 85027 COMPLETE CBC AUTOMATED: CPT

## 2017-08-04 PROCEDURE — 86140 C-REACTIVE PROTEIN: CPT

## 2017-08-04 PROCEDURE — 36415 COLL VENOUS BLD VENIPUNCTURE: CPT

## 2017-08-04 PROCEDURE — 73718 MRI LOWER EXTREMITY W/O DYE: CPT

## 2017-08-04 PROCEDURE — 83735 ASSAY OF MAGNESIUM: CPT

## 2017-08-04 PROCEDURE — 80048 BASIC METABOLIC PNL TOTAL CA: CPT

## 2017-08-04 PROCEDURE — 96365 THER/PROPH/DIAG IV INF INIT: CPT

## 2017-08-04 PROCEDURE — 87040 BLOOD CULTURE FOR BACTERIA: CPT

## 2017-08-04 PROCEDURE — 80053 COMPREHEN METABOLIC PANEL: CPT

## 2017-08-04 PROCEDURE — 83605 ASSAY OF LACTIC ACID: CPT

## 2017-08-04 PROCEDURE — 99285 EMERGENCY DEPT VISIT HI MDM: CPT | Mod: 25

## 2017-08-04 PROCEDURE — 96372 THER/PROPH/DIAG INJ SC/IM: CPT | Mod: 59

## 2017-08-04 PROCEDURE — 73721 MRI JNT OF LWR EXTRE W/O DYE: CPT

## 2019-07-21 ENCOUNTER — EMERGENCY (EMERGENCY)
Facility: HOSPITAL | Age: 59
LOS: 1 days | Discharge: HOME | End: 2019-07-21
Attending: INTERNAL MEDICINE | Admitting: INTERNAL MEDICINE
Payer: MEDICARE

## 2019-07-21 VITALS
OXYGEN SATURATION: 97 % | RESPIRATION RATE: 17 BRPM | SYSTOLIC BLOOD PRESSURE: 145 MMHG | DIASTOLIC BLOOD PRESSURE: 78 MMHG | HEART RATE: 85 BPM | TEMPERATURE: 98 F

## 2019-07-21 VITALS
TEMPERATURE: 100 F | OXYGEN SATURATION: 96 % | HEIGHT: 66 IN | RESPIRATION RATE: 16 BRPM | DIASTOLIC BLOOD PRESSURE: 68 MMHG | WEIGHT: 246.04 LBS | SYSTOLIC BLOOD PRESSURE: 160 MMHG | HEART RATE: 83 BPM

## 2019-07-21 DIAGNOSIS — Z41.9 ENCOUNTER FOR PROCEDURE FOR PURPOSES OTHER THAN REMEDYING HEALTH STATE, UNSPECIFIED: Chronic | ICD-10-CM

## 2019-07-21 DIAGNOSIS — Z98.890 OTHER SPECIFIED POSTPROCEDURAL STATES: Chronic | ICD-10-CM

## 2019-07-21 DIAGNOSIS — Z90.49 ACQUIRED ABSENCE OF OTHER SPECIFIED PARTS OF DIGESTIVE TRACT: Chronic | ICD-10-CM

## 2019-07-21 DIAGNOSIS — Z98.89 OTHER SPECIFIED POSTPROCEDURAL STATES: Chronic | ICD-10-CM

## 2019-07-21 PROBLEM — E11.9 TYPE 2 DIABETES MELLITUS WITHOUT COMPLICATIONS: Chronic | Status: ACTIVE | Noted: 2017-04-19

## 2019-07-21 PROBLEM — I10 ESSENTIAL (PRIMARY) HYPERTENSION: Chronic | Status: ACTIVE | Noted: 2017-04-19

## 2019-07-21 LAB
ALBUMIN SERPL ELPH-MCNC: 3.4 G/DL — SIGNIFICANT CHANGE UP (ref 3.3–5)
ALP SERPL-CCNC: 202 U/L — HIGH (ref 40–120)
ALT FLD-CCNC: 19 U/L — SIGNIFICANT CHANGE UP (ref 12–78)
ANION GAP SERPL CALC-SCNC: 5 MMOL/L — SIGNIFICANT CHANGE UP (ref 5–17)
APPEARANCE UR: ABNORMAL
AST SERPL-CCNC: 16 U/L — SIGNIFICANT CHANGE UP (ref 15–37)
BACTERIA # UR AUTO: ABNORMAL
BILIRUB SERPL-MCNC: 0.3 MG/DL — SIGNIFICANT CHANGE UP (ref 0.2–1.2)
BILIRUB UR-MCNC: NEGATIVE — SIGNIFICANT CHANGE UP
BUN SERPL-MCNC: 37 MG/DL — HIGH (ref 7–23)
CALCIUM SERPL-MCNC: 8.7 MG/DL — SIGNIFICANT CHANGE UP (ref 8.5–10.1)
CHLORIDE SERPL-SCNC: 99 MMOL/L — SIGNIFICANT CHANGE UP (ref 96–108)
CO2 SERPL-SCNC: 32 MMOL/L — HIGH (ref 22–31)
COLOR SPEC: YELLOW — SIGNIFICANT CHANGE UP
COMMENT - URINE: SIGNIFICANT CHANGE UP
CREAT SERPL-MCNC: 4.8 MG/DL — HIGH (ref 0.5–1.3)
DIFF PNL FLD: ABNORMAL
EPI CELLS # UR: SIGNIFICANT CHANGE UP
GLUCOSE SERPL-MCNC: 279 MG/DL — HIGH (ref 70–99)
GLUCOSE UR QL: 300 MG/DL
HCT VFR BLD CALC: 37.4 % — SIGNIFICANT CHANGE UP (ref 34.5–45)
HGB BLD-MCNC: 11.6 G/DL — SIGNIFICANT CHANGE UP (ref 11.5–15.5)
KETONES UR-MCNC: NEGATIVE — SIGNIFICANT CHANGE UP
LEUKOCYTE ESTERASE UR-ACNC: ABNORMAL
MCHC RBC-ENTMCNC: 25.8 PG — LOW (ref 27–34)
MCHC RBC-ENTMCNC: 31 GM/DL — LOW (ref 32–36)
MCV RBC AUTO: 83.3 FL — SIGNIFICANT CHANGE UP (ref 80–100)
NITRITE UR-MCNC: NEGATIVE — SIGNIFICANT CHANGE UP
NRBC # BLD: 0 /100 WBCS — SIGNIFICANT CHANGE UP (ref 0–0)
PH UR: 6 — SIGNIFICANT CHANGE UP (ref 5–8)
PLATELET # BLD AUTO: 237 K/UL — SIGNIFICANT CHANGE UP (ref 150–400)
POTASSIUM SERPL-MCNC: 4.4 MMOL/L — SIGNIFICANT CHANGE UP (ref 3.5–5.3)
POTASSIUM SERPL-SCNC: 4.4 MMOL/L — SIGNIFICANT CHANGE UP (ref 3.5–5.3)
PROT SERPL-MCNC: 8.4 G/DL — HIGH (ref 6–8.3)
PROT UR-MCNC: 500 MG/DL
RBC # BLD: 4.49 M/UL — SIGNIFICANT CHANGE UP (ref 3.8–5.2)
RBC # FLD: 16.4 % — HIGH (ref 10.3–14.5)
RBC CASTS # UR COMP ASSIST: ABNORMAL /HPF (ref 0–4)
SODIUM SERPL-SCNC: 136 MMOL/L — SIGNIFICANT CHANGE UP (ref 135–145)
SP GR SPEC: 1.01 — SIGNIFICANT CHANGE UP (ref 1.01–1.02)
UROBILINOGEN FLD QL: NEGATIVE — SIGNIFICANT CHANGE UP
WBC # BLD: 8.26 K/UL — SIGNIFICANT CHANGE UP (ref 3.8–10.5)
WBC # FLD AUTO: 8.26 K/UL — SIGNIFICANT CHANGE UP (ref 3.8–10.5)
WBC UR QL: >50

## 2019-07-21 PROCEDURE — 36415 COLL VENOUS BLD VENIPUNCTURE: CPT

## 2019-07-21 PROCEDURE — 87186 SC STD MICRODIL/AGAR DIL: CPT

## 2019-07-21 PROCEDURE — 99284 EMERGENCY DEPT VISIT MOD MDM: CPT

## 2019-07-21 PROCEDURE — 99284 EMERGENCY DEPT VISIT MOD MDM: CPT | Mod: 25

## 2019-07-21 PROCEDURE — 81001 URINALYSIS AUTO W/SCOPE: CPT

## 2019-07-21 PROCEDURE — 74176 CT ABD & PELVIS W/O CONTRAST: CPT

## 2019-07-21 PROCEDURE — 85027 COMPLETE CBC AUTOMATED: CPT

## 2019-07-21 PROCEDURE — 87086 URINE CULTURE/COLONY COUNT: CPT

## 2019-07-21 PROCEDURE — 74176 CT ABD & PELVIS W/O CONTRAST: CPT | Mod: 26

## 2019-07-21 PROCEDURE — 80053 COMPREHEN METABOLIC PANEL: CPT

## 2019-07-21 RX ORDER — METOPROLOL TARTRATE 50 MG
1 TABLET ORAL
Qty: 0 | Refills: 0 | DISCHARGE

## 2019-07-21 RX ORDER — CEFUROXIME AXETIL 250 MG
1 TABLET ORAL
Qty: 20 | Refills: 0
Start: 2019-07-21 | End: 2019-07-30

## 2019-07-21 RX ORDER — METFORMIN HYDROCHLORIDE 850 MG/1
2 TABLET ORAL
Qty: 0 | Refills: 0 | DISCHARGE

## 2019-07-21 RX ORDER — GLIMEPIRIDE 1 MG
1 TABLET ORAL
Qty: 0 | Refills: 0 | DISCHARGE

## 2019-07-21 RX ORDER — CEFUROXIME AXETIL 250 MG
250 TABLET ORAL ONCE
Refills: 0 | Status: COMPLETED | OUTPATIENT
Start: 2019-07-21 | End: 2019-07-21

## 2019-07-21 RX ADMIN — Medication 250 MILLIGRAM(S): at 08:44

## 2019-07-21 NOTE — ED ADULT NURSE NOTE - OBJECTIVE STATEMENT
Pt received alert and oriented x 4 c/o back pain. Pt reports 10/10 pressure pain to lower back pain radiating to lower abd and bilateral legs since yesterday after dialysis. Pt reports nausea, burning urination, denies vomiting, diarrhea, fever, chills, hematuria. Pt reports taking tylenol--denies any relief. Dialysis [place], Tuesday, Thursday, Saturday.

## 2019-07-21 NOTE — ED PROVIDER NOTE - CARE PLAN
Principal Discharge DX:	Abdominal pain Principal Discharge DX:	Abdominal pain  Secondary Diagnosis:	Acute cystitis with hematuria

## 2019-07-21 NOTE — ED PROVIDER NOTE - PMH
Diabetes    Endometrial hyperplasia, unspecified    Hemodialysis access site with arteriovenous graft    Hypertension    Postmenopausal bleeding    Type 2 diabetes mellitus

## 2019-07-21 NOTE — ED ADULT NURSE REASSESSMENT NOTE - NSIMPLEMENTINTERV_GEN_ALL_ED
Implemented All Fall with Harm Risk Interventions:  [place] to call system. Call bell, personal items and telephone within reach. Instruct patient to call for assistance. Room bathroom lighting operational. Non-slip footwear when patient is off stretcher. Physically safe environment: no spills, clutter or unnecessary equipment. Stretcher in lowest position, wheels locked, appropriate side rails in place. Provide visual cue, wrist band, yellow gown, etc. Monitor gait and stability. Monitor for mental status changes and reorient to person, place, and time. Review medications for side effects contributing to fall risk. Reinforce activity limits and safety measures with patient and family. Provide visual clues: red socks.

## 2019-07-21 NOTE — ED ADULT NURSE REASSESSMENT NOTE - NS ED NURSE REASSESS COMMENT FT1
Received pt in bed alert and oriented from [place].   at bedside.  Pt complaints of nursing during urination.  Urine sent.  Assisted to bedside commade.  AV fistula left arm, brut strong and thrill.  Ongoing nursing care and safety maintained.

## 2019-07-21 NOTE — ED ADULT NURSE NOTE - CHIEF COMPLAINT QUOTE
Patient complaining of lower back pain radiating to bilateral lower leg and lower abdominal pain started yesterday after her dialysis took tylenol at home at 9pm

## 2019-07-21 NOTE — ED PROVIDER NOTE - OBJECTIVE STATEMENT
Patient complaining of lower back pain radiating to bilateral lower leg and lower abdominal pain started yesterday after her dialysis took tylenol at home at 9pm  back pain general, abdominal pain 59 y/o HF h/o HTN, DM, ESRD UTI  C/C  lower abdominal  pain with radiation to the back started yesterday after her dialysis, she took Tylenol at home at 9pm  She was evaluated by Dr Dutta for UTI and is taking cranberry juice. No fever, no chills, no CP, no SOB, no N/V/D, no urinary symptoms, no acute stroke symptoms.

## 2019-07-21 NOTE — ED ADULT NURSE NOTE - NSIMPLEMENTINTERV_GEN_ALL_ED
Implemented All Fall Risk Interventions:  [place] to call system. Call bell, personal items and telephone within reach. Instruct patient to call for assistance. Room bathroom lighting operational. Non-slip footwear when patient is off stretcher. Physically safe environment: no spills, clutter or unnecessary equipment. Stretcher in lowest position, wheels locked, appropriate side rails in place. Provide visual cue, wrist band, yellow gown, etc. Monitor gait and stability. Monitor for mental status changes and reorient to person, place, and time. Review medications for side effects contributing to fall risk. Reinforce activity limits and safety measures with patient and family.

## 2019-07-21 NOTE — ED ADULT TRIAGE NOTE - CHIEF COMPLAINT QUOTE
Patient complaining of lower back pain radiating to bilateral lower leg and lower abdominal pain started yesterday after her dialysis took tylenol at home at 9pm Patient complaining of lower back pain radiating to bilateral lower leg and lower abdominal pain with pain when she urinates started yesterday after her dialysis took tylenol at home at 9pm

## 2019-07-23 LAB
-  AMIKACIN: SIGNIFICANT CHANGE UP
-  AMPICILLIN/SULBACTAM: SIGNIFICANT CHANGE UP
-  AMPICILLIN: SIGNIFICANT CHANGE UP
-  AZTREONAM: SIGNIFICANT CHANGE UP
-  CEFEPIME: SIGNIFICANT CHANGE UP
-  CEFOXITIN: SIGNIFICANT CHANGE UP
-  CEFTRIAXONE: SIGNIFICANT CHANGE UP
-  CIPROFLOXACIN: SIGNIFICANT CHANGE UP
-  ERTAPENEM: SIGNIFICANT CHANGE UP
-  GENTAMICIN: SIGNIFICANT CHANGE UP
-  IMIPENEM: SIGNIFICANT CHANGE UP
-  LEVOFLOXACIN: SIGNIFICANT CHANGE UP
-  MEROPENEM: SIGNIFICANT CHANGE UP
-  NITROFURANTOIN: SIGNIFICANT CHANGE UP
-  PIPERACILLIN/TAZOBACTAM: SIGNIFICANT CHANGE UP
-  TIGECYCLINE: SIGNIFICANT CHANGE UP
-  TOBRAMYCIN: SIGNIFICANT CHANGE UP
-  TRIMETHOPRIM/SULFAMETHOXAZOLE: SIGNIFICANT CHANGE UP
CULTURE RESULTS: SIGNIFICANT CHANGE UP
METHOD TYPE: SIGNIFICANT CHANGE UP
ORGANISM # SPEC MICROSCOPIC CNT: SIGNIFICANT CHANGE UP
ORGANISM # SPEC MICROSCOPIC CNT: SIGNIFICANT CHANGE UP
SPECIMEN SOURCE: SIGNIFICANT CHANGE UP

## 2020-02-12 ENCOUNTER — APPOINTMENT (OUTPATIENT)
Dept: NEPHROLOGY | Facility: CLINIC | Age: 60
End: 2020-02-12

## 2020-02-12 ENCOUNTER — APPOINTMENT (OUTPATIENT)
Dept: TRANSPLANT | Facility: CLINIC | Age: 60
End: 2020-02-12

## 2020-02-22 ENCOUNTER — EMERGENCY (EMERGENCY)
Facility: HOSPITAL | Age: 60
LOS: 1 days | Discharge: HOME | End: 2020-02-22
Attending: EMERGENCY MEDICINE | Admitting: EMERGENCY MEDICINE
Payer: COMMERCIAL

## 2020-02-22 VITALS
RESPIRATION RATE: 16 BRPM | DIASTOLIC BLOOD PRESSURE: 66 MMHG | SYSTOLIC BLOOD PRESSURE: 151 MMHG | OXYGEN SATURATION: 96 % | HEART RATE: 92 BPM

## 2020-02-22 VITALS
TEMPERATURE: 99 F | SYSTOLIC BLOOD PRESSURE: 184 MMHG | HEIGHT: 66 IN | DIASTOLIC BLOOD PRESSURE: 77 MMHG | OXYGEN SATURATION: 98 % | HEART RATE: 101 BPM | WEIGHT: 255.07 LBS | RESPIRATION RATE: 18 BRPM

## 2020-02-22 DIAGNOSIS — Z90.49 ACQUIRED ABSENCE OF OTHER SPECIFIED PARTS OF DIGESTIVE TRACT: Chronic | ICD-10-CM

## 2020-02-22 DIAGNOSIS — Z98.890 OTHER SPECIFIED POSTPROCEDURAL STATES: Chronic | ICD-10-CM

## 2020-02-22 DIAGNOSIS — Z41.9 ENCOUNTER FOR PROCEDURE FOR PURPOSES OTHER THAN REMEDYING HEALTH STATE, UNSPECIFIED: Chronic | ICD-10-CM

## 2020-02-22 DIAGNOSIS — Z98.89 OTHER SPECIFIED POSTPROCEDURAL STATES: Chronic | ICD-10-CM

## 2020-02-22 LAB
ALBUMIN SERPL ELPH-MCNC: 3.4 G/DL — SIGNIFICANT CHANGE UP (ref 3.3–5)
ALP SERPL-CCNC: 163 U/L — HIGH (ref 40–120)
ALT FLD-CCNC: 20 U/L — SIGNIFICANT CHANGE UP (ref 12–78)
ANION GAP SERPL CALC-SCNC: 6 MMOL/L — SIGNIFICANT CHANGE UP (ref 5–17)
APTT BLD: 34 SEC — SIGNIFICANT CHANGE UP (ref 28.5–37)
AST SERPL-CCNC: 22 U/L — SIGNIFICANT CHANGE UP (ref 15–37)
BASOPHILS # BLD AUTO: 0.04 K/UL — SIGNIFICANT CHANGE UP (ref 0–0.2)
BASOPHILS NFR BLD AUTO: 0.5 % — SIGNIFICANT CHANGE UP (ref 0–2)
BILIRUB SERPL-MCNC: 0.4 MG/DL — SIGNIFICANT CHANGE UP (ref 0.2–1.2)
BUN SERPL-MCNC: 25 MG/DL — HIGH (ref 7–23)
CALCIUM SERPL-MCNC: 9.3 MG/DL — SIGNIFICANT CHANGE UP (ref 8.5–10.1)
CHLORIDE SERPL-SCNC: 102 MMOL/L — SIGNIFICANT CHANGE UP (ref 96–108)
CO2 SERPL-SCNC: 28 MMOL/L — SIGNIFICANT CHANGE UP (ref 22–31)
CREAT SERPL-MCNC: 3.5 MG/DL — HIGH (ref 0.5–1.3)
EOSINOPHIL # BLD AUTO: 0.33 K/UL — SIGNIFICANT CHANGE UP (ref 0–0.5)
EOSINOPHIL NFR BLD AUTO: 4.4 % — SIGNIFICANT CHANGE UP (ref 0–6)
GLUCOSE SERPL-MCNC: 209 MG/DL — HIGH (ref 70–99)
HCT VFR BLD CALC: 37.2 % — SIGNIFICANT CHANGE UP (ref 34.5–45)
HGB BLD-MCNC: 12 G/DL — SIGNIFICANT CHANGE UP (ref 11.5–15.5)
IMM GRANULOCYTES NFR BLD AUTO: 0.3 % — SIGNIFICANT CHANGE UP (ref 0–1.5)
INR BLD: 1.03 RATIO — SIGNIFICANT CHANGE UP (ref 0.88–1.16)
LYMPHOCYTES # BLD AUTO: 1.71 K/UL — SIGNIFICANT CHANGE UP (ref 1–3.3)
LYMPHOCYTES # BLD AUTO: 22.7 % — SIGNIFICANT CHANGE UP (ref 13–44)
MCHC RBC-ENTMCNC: 27.9 PG — SIGNIFICANT CHANGE UP (ref 27–34)
MCHC RBC-ENTMCNC: 32.3 GM/DL — SIGNIFICANT CHANGE UP (ref 32–36)
MCV RBC AUTO: 86.5 FL — SIGNIFICANT CHANGE UP (ref 80–100)
MONOCYTES # BLD AUTO: 0.59 K/UL — SIGNIFICANT CHANGE UP (ref 0–0.9)
MONOCYTES NFR BLD AUTO: 7.8 % — SIGNIFICANT CHANGE UP (ref 2–14)
NEUTROPHILS # BLD AUTO: 4.84 K/UL — SIGNIFICANT CHANGE UP (ref 1.8–7.4)
NEUTROPHILS NFR BLD AUTO: 64.3 % — SIGNIFICANT CHANGE UP (ref 43–77)
NRBC # BLD: 0 /100 WBCS — SIGNIFICANT CHANGE UP (ref 0–0)
PLATELET # BLD AUTO: 245 K/UL — SIGNIFICANT CHANGE UP (ref 150–400)
POTASSIUM SERPL-MCNC: 3.9 MMOL/L — SIGNIFICANT CHANGE UP (ref 3.5–5.3)
POTASSIUM SERPL-SCNC: 3.9 MMOL/L — SIGNIFICANT CHANGE UP (ref 3.5–5.3)
PROT SERPL-MCNC: 8.4 G/DL — HIGH (ref 6–8.3)
PROTHROM AB SERPL-ACNC: 11.5 SEC — SIGNIFICANT CHANGE UP (ref 10–12.9)
RBC # BLD: 4.3 M/UL — SIGNIFICANT CHANGE UP (ref 3.8–5.2)
RBC # FLD: 16.6 % — HIGH (ref 10.3–14.5)
SODIUM SERPL-SCNC: 136 MMOL/L — SIGNIFICANT CHANGE UP (ref 135–145)
WBC # BLD: 7.53 K/UL — SIGNIFICANT CHANGE UP (ref 3.8–10.5)
WBC # FLD AUTO: 7.53 K/UL — SIGNIFICANT CHANGE UP (ref 3.8–10.5)

## 2020-02-22 PROCEDURE — 70450 CT HEAD/BRAIN W/O DYE: CPT | Mod: 26

## 2020-02-22 PROCEDURE — 82962 GLUCOSE BLOOD TEST: CPT

## 2020-02-22 PROCEDURE — 85027 COMPLETE CBC AUTOMATED: CPT

## 2020-02-22 PROCEDURE — 85730 THROMBOPLASTIN TIME PARTIAL: CPT

## 2020-02-22 PROCEDURE — 99284 EMERGENCY DEPT VISIT MOD MDM: CPT

## 2020-02-22 PROCEDURE — 70450 CT HEAD/BRAIN W/O DYE: CPT

## 2020-02-22 PROCEDURE — 80053 COMPREHEN METABOLIC PANEL: CPT

## 2020-02-22 PROCEDURE — 85610 PROTHROMBIN TIME: CPT

## 2020-02-22 PROCEDURE — 93010 ELECTROCARDIOGRAM REPORT: CPT

## 2020-02-22 PROCEDURE — 93005 ELECTROCARDIOGRAM TRACING: CPT

## 2020-02-22 PROCEDURE — 36415 COLL VENOUS BLD VENIPUNCTURE: CPT

## 2020-02-22 PROCEDURE — 99284 EMERGENCY DEPT VISIT MOD MDM: CPT | Mod: 25

## 2020-02-22 RX ORDER — LIPASE/PROTEASE/AMYLASE 16-48-48K
0 CAPSULE,DELAYED RELEASE (ENTERIC COATED) ORAL
Qty: 0 | Refills: 0 | DISCHARGE

## 2020-02-22 RX ORDER — SEVELAMER CARBONATE 2400 MG/1
0 POWDER, FOR SUSPENSION ORAL
Qty: 0 | Refills: 0 | DISCHARGE

## 2020-02-22 RX ORDER — CARVEDILOL PHOSPHATE 80 MG/1
0 CAPSULE, EXTENDED RELEASE ORAL
Qty: 0 | Refills: 0 | DISCHARGE

## 2020-02-22 NOTE — ED PROVIDER NOTE - PATIENT PORTAL LINK FT
You can access the FollowMyHealth Patient Portal offered by [facility] by registering at the following website: [email or web address]. By joining to[facility]’s FollowMyHealth portal, you will also be able to view your health information using other applications (apps) compatible with our system.

## 2020-02-22 NOTE — ED PROVIDER NOTE - ENMT, MLM
Airway patent, Nasal mucosa clear. Mouth with normal mucosa. Throat has no vesicles, no oropharyngeal exudates and uvula is midline. right facial asymmetry

## 2020-02-22 NOTE — ED ADULT NURSE NOTE - OBJECTIVE STATEMENT
Patient alert and oriented X 3. Complaining of left sided facial numbness, left arm numbness and left leg cramping that started during dialysis 30 minutes prior to arrival. Patient completed 3/4 hours of dialysis. Denies chest pain, shortness of breath, nausea, vomiting, headache, dizziness and fever. Lungs clears bilaterally. Respirations even and not labored. Abdomen soft non tender. Fistula noted to left arm  +bruit and thrill Patient alert and oriented X 3. Complaining of left sided facial numbness, left arm numbness and left leg cramping that started during dialysis 30 minutes prior to arrival. Patient completed 3/4 hours of dialysis. + uneven smile Denies chest pain, shortness of breath, nausea, vomiting, headache, dizziness and fever. Lungs clears bilaterally. Respirations even and not labored. Abdomen soft non tender. Fistula noted to left arm  +bruit and thrill. Dr. French made aware Patient alert and oriented X 3. Complaining of left sided facial numbness, left arm numbness and left leg cramping that started during dialysis 30 minutes prior to arrival. Patient completed 3/4 hours of dialysis. + uneven smile which  at the bedside is not new. Patient state she had a stroke 30 years ago Denies chest pain, shortness of breath, nausea, vomiting, headache, dizziness and fever. Lungs clears bilaterally. Respirations even and not labored. Abdomen soft non tender. Fistula noted to left arm  +bruit and thrill. Dr. French made aware

## 2020-02-22 NOTE — ED PROVIDER NOTE - ATTENDING CONTRIBUTION TO CARE
60 y/o F with ESRD on HD with CVA resulting in right facial weakness sent in from HD for left sided upper and lower ext weakness/tingling 3 hrs into dialisis.  stroke code was called, neuro consulted.  pt to be admitted for MRI.  CT head non con neg for acute bleed.  symptoms resolved while in the ED and pt refuses to stay for MRI since symptoms have improved.  AMA for filled out.

## 2020-02-22 NOTE — ED ADULT TRIAGE NOTE - CHIEF COMPLAINT QUOTE
pt was just at dialysis and stated during dialysis felt left sided  numbness to face and lips and arm - started about 30 minutes ago

## 2020-02-22 NOTE — ED PROVIDER NOTE - OBJECTIVE STATEMENT
Pt is a 60 yo female with pmhx of cva right side residual facial asymmetry HTN, DM, ESRD last dialysis today completed 3 hours of dialysis today UTI here for left sided facial upper and lower extremity numbness tingling which started 30 min pta while in dialysis. Pt now feels better denies any chest pain sob abdominal pain nvd fever chills falls.     pcp Nga

## 2020-02-22 NOTE — ED PROVIDER NOTE - NSFOLLOWUPINSTRUCTIONS_ED_ALL_ED_FT
you signed out against medical advise and risks were discussed with you and refused admission please follow up with pcp and neurology return to er for any worsening symptoms and call [phone] immediately

## 2020-02-22 NOTE — ED PROVIDER NOTE - NSCAREINITIATED _GEN_ER
Atrial fibrillation  on aspirin (last taken 1/2/2018)  Cervical cancer  Stage I per the daughter s/p hysterectomy april 2000 (complicated by: adhesions / pna)  Diabetes mellitus    Endocarditis  2010 (treated w/ antibiotics in [facility])  Hypertension    SDH (subdural hematoma)
Jonny Rhodes(PA)

## 2020-02-22 NOTE — ED PROVIDER NOTE - CARE PROVIDER_API CALL
Elan Moore)  Neurology  [address] [place] [address]  Phone: [phone]  Fax: [phone]  Follow Up Time:

## 2020-02-22 NOTE — ED ADULT NURSE NOTE - PMH
Diabetes    Endometrial hyperplasia, unspecified    Hemodialysis access site with arteriovenous graft    Hypertension    Postmenopausal bleeding    Type 2 diabetes mellitus Diabetes    Endometrial hyperplasia, unspecified    Hemodialysis access site with arteriovenous graft    Hypertension    Postmenopausal bleeding    Stroke    Type 2 diabetes mellitus

## 2020-02-22 NOTE — ED PROVIDER NOTE - PMH
Diabetes    Endometrial hyperplasia, unspecified    Hemodialysis access site with arteriovenous graft    Hypertension    Postmenopausal bleeding    Stroke    Type 2 diabetes mellitus

## 2020-02-22 NOTE — ED PROVIDER NOTE - PROGRESS NOTE DETAILS
ct stroke negative spoke with Dr. Lorenzo advised admission for observation pt refused states feels better and wants to go home risk dw pt The patient has decided to leave against medical advice (AMA).  I have made reasonable attempts to explain to the patient that leaving prior to completion of work up and treatment may result in recurrent or worsening of symptoms, severe permanent disability, pain and suffering, harm, injury, and/or death.  I have explained the risks, benefits, and alternatives to treatment as well as the attendant risks of refusing treatment at this time.  The patient has demonstrated comprehension and verbalizes understanding of these risks.  The patient has been told that they must return to the ER immediately for persistent or recurring symptoms, worsening symptoms, or any concerning symptoms.  The patient has also been informed that they may return to the ER immediately at any time if they change their mind and wish to resume care. The patient has been given the opportunity to ask questions and have them fully answered.

## 2020-02-22 NOTE — ED ADULT NURSE NOTE - CHPI ED NUR SYMPTOMS NEG
no change in level of consciousness/no fever/no nausea/no dizziness/no loss of consciousness/no vomiting/no confusion/no blurred vision

## 2020-08-18 NOTE — ED ADULT NURSE NOTE - CAS EDN DISCHARGE ASSESSMENT
Made 8/19 appt reminder call, spoke w/ dtr and confirmed. Please use [phone] number for VV (dtr's number).
Alert and oriented to person, place and time

## 2020-09-21 NOTE — ED ADULT TRIAGE NOTE - HEIGHT IN INCHES
6
Additional Notes: Denies hx of cold sores. Call if lips starts to tingle or burn, at that point may consider cold sore medication. Patient will call the office if this changes.
Detail Level: Simple

## 2022-03-07 ENCOUNTER — OUTPATIENT (OUTPATIENT)
Dept: OUTPATIENT SERVICES | Facility: HOSPITAL | Age: 62
LOS: 1 days | End: 2022-03-07
Payer: MEDICARE

## 2022-03-07 VITALS
RESPIRATION RATE: 16 BRPM | OXYGEN SATURATION: 96 % | HEART RATE: 78 BPM | WEIGHT: 250 LBS | TEMPERATURE: 98 F | DIASTOLIC BLOOD PRESSURE: 60 MMHG | SYSTOLIC BLOOD PRESSURE: 154 MMHG

## 2022-03-07 DIAGNOSIS — Z90.49 ACQUIRED ABSENCE OF OTHER SPECIFIED PARTS OF DIGESTIVE TRACT: Chronic | ICD-10-CM

## 2022-03-07 DIAGNOSIS — Z98.890 OTHER SPECIFIED POSTPROCEDURAL STATES: Chronic | ICD-10-CM

## 2022-03-07 DIAGNOSIS — N18.6 END STAGE RENAL DISEASE: Chronic | ICD-10-CM

## 2022-03-07 DIAGNOSIS — N84.0 POLYP OF CORPUS UTERI: ICD-10-CM

## 2022-03-07 DIAGNOSIS — Z01.818 ENCOUNTER FOR OTHER PREPROCEDURAL EXAMINATION: ICD-10-CM

## 2022-03-07 DIAGNOSIS — R93.5 ABNORMAL FINDINGS ON DIAGNOSTIC IMAGING OF OTHER ABDOMINAL REGIONS, INCLUDING RETROPERITONEUM: ICD-10-CM

## 2022-03-07 DIAGNOSIS — Z41.9 ENCOUNTER FOR PROCEDURE FOR PURPOSES OTHER THAN REMEDYING HEALTH STATE, UNSPECIFIED: Chronic | ICD-10-CM

## 2022-03-07 DIAGNOSIS — Z98.89 OTHER SPECIFIED POSTPROCEDURAL STATES: Chronic | ICD-10-CM

## 2022-03-07 DIAGNOSIS — N18.6 END STAGE RENAL DISEASE: ICD-10-CM

## 2022-03-07 DIAGNOSIS — E11.9 TYPE 2 DIABETES MELLITUS WITHOUT COMPLICATIONS: ICD-10-CM

## 2022-03-07 DIAGNOSIS — I48.91 UNSPECIFIED ATRIAL FIBRILLATION: ICD-10-CM

## 2022-03-07 LAB
ALBUMIN SERPL ELPH-MCNC: 3.5 G/DL — SIGNIFICANT CHANGE UP (ref 3.3–5)
ALP SERPL-CCNC: 121 U/L — HIGH (ref 40–120)
ALT FLD-CCNC: 19 U/L — SIGNIFICANT CHANGE UP (ref 12–78)
ANION GAP SERPL CALC-SCNC: 11 MMOL/L — SIGNIFICANT CHANGE UP (ref 5–17)
AST SERPL-CCNC: 17 U/L — SIGNIFICANT CHANGE UP (ref 15–37)
BILIRUB SERPL-MCNC: 0.3 MG/DL — SIGNIFICANT CHANGE UP (ref 0.2–1.2)
BLD GP AB SCN SERPL QL: SIGNIFICANT CHANGE UP
BUN SERPL-MCNC: 75 MG/DL — HIGH (ref 7–23)
CALCIUM SERPL-MCNC: 10 MG/DL — SIGNIFICANT CHANGE UP (ref 8.5–10.1)
CHLORIDE SERPL-SCNC: 105 MMOL/L — SIGNIFICANT CHANGE UP (ref 96–108)
CO2 SERPL-SCNC: 23 MMOL/L — SIGNIFICANT CHANGE UP (ref 22–31)
CREAT SERPL-MCNC: 7.8 MG/DL — HIGH (ref 0.5–1.3)
EGFR: 5 ML/MIN/1.73M2 — LOW
GLUCOSE SERPL-MCNC: 137 MG/DL — HIGH (ref 70–99)
HCT VFR BLD CALC: 33.4 % — LOW (ref 34.5–45)
HGB BLD-MCNC: 11 G/DL — LOW (ref 11.5–15.5)
MCHC RBC-ENTMCNC: 28.3 PG — SIGNIFICANT CHANGE UP (ref 27–34)
MCHC RBC-ENTMCNC: 32.9 GM/DL — SIGNIFICANT CHANGE UP (ref 32–36)
MCV RBC AUTO: 85.9 FL — SIGNIFICANT CHANGE UP (ref 80–100)
NRBC # BLD: 0 /100 WBCS — SIGNIFICANT CHANGE UP (ref 0–0)
PLATELET # BLD AUTO: 238 K/UL — SIGNIFICANT CHANGE UP (ref 150–400)
POTASSIUM SERPL-MCNC: 5.5 MMOL/L — HIGH (ref 3.5–5.3)
POTASSIUM SERPL-SCNC: 5.5 MMOL/L — HIGH (ref 3.5–5.3)
PROT SERPL-MCNC: 8.1 G/DL — SIGNIFICANT CHANGE UP (ref 6–8.3)
RBC # BLD: 3.89 M/UL — SIGNIFICANT CHANGE UP (ref 3.8–5.2)
RBC # FLD: 14.4 % — SIGNIFICANT CHANGE UP (ref 10.3–14.5)
SODIUM SERPL-SCNC: 139 MMOL/L — SIGNIFICANT CHANGE UP (ref 135–145)
WBC # BLD: 10.25 K/UL — SIGNIFICANT CHANGE UP (ref 3.8–10.5)
WBC # FLD AUTO: 10.25 K/UL — SIGNIFICANT CHANGE UP (ref 3.8–10.5)

## 2022-03-07 PROCEDURE — 86900 BLOOD TYPING SEROLOGIC ABO: CPT

## 2022-03-07 PROCEDURE — G0463: CPT

## 2022-03-07 PROCEDURE — 36415 COLL VENOUS BLD VENIPUNCTURE: CPT

## 2022-03-07 PROCEDURE — 86901 BLOOD TYPING SEROLOGIC RH(D): CPT

## 2022-03-07 PROCEDURE — 83036 HEMOGLOBIN GLYCOSYLATED A1C: CPT

## 2022-03-07 PROCEDURE — 85027 COMPLETE CBC AUTOMATED: CPT

## 2022-03-07 PROCEDURE — 80053 COMPREHEN METABOLIC PANEL: CPT

## 2022-03-07 PROCEDURE — 93005 ELECTROCARDIOGRAM TRACING: CPT

## 2022-03-07 PROCEDURE — 93010 ELECTROCARDIOGRAM REPORT: CPT

## 2022-03-07 PROCEDURE — 86850 RBC ANTIBODY SCREEN: CPT

## 2022-03-07 RX ORDER — CARVEDILOL PHOSPHATE 80 MG/1
0 CAPSULE, EXTENDED RELEASE ORAL
Qty: 0 | Refills: 0 | DISCHARGE

## 2022-03-07 RX ORDER — CALCIUM ACETATE 667 MG
0 TABLET ORAL
Qty: 0 | Refills: 0 | DISCHARGE

## 2022-03-07 RX ORDER — PATIROMER 8.4 G/1
0 POWDER, FOR SUSPENSION ORAL
Qty: 0 | Refills: 3 | DISCHARGE

## 2022-03-07 RX ORDER — FAMOTIDINE 10 MG/ML
1 INJECTION INTRAVENOUS
Qty: 0 | Refills: 0 | DISCHARGE

## 2022-03-07 RX ORDER — GLIMEPIRIDE 1 MG
0 TABLET ORAL
Qty: 0 | Refills: 0 | DISCHARGE

## 2022-03-07 RX ORDER — DILTIAZEM HCL 120 MG
0 CAPSULE, EXT RELEASE 24 HR ORAL
Qty: 0 | Refills: 0 | DISCHARGE

## 2022-03-07 RX ORDER — SEVELAMER CARBONATE 2400 MG/1
0 POWDER, FOR SUSPENSION ORAL
Qty: 0 | Refills: 0 | DISCHARGE

## 2022-03-07 RX ORDER — APIXABAN 2.5 MG/1
0 TABLET, FILM COATED ORAL
Qty: 0 | Refills: 0 | DISCHARGE

## 2022-03-07 RX ORDER — EMPAGLIFLOZIN 10 MG/1
0 TABLET, FILM COATED ORAL
Qty: 0 | Refills: 0 | DISCHARGE

## 2022-03-07 NOTE — H&P PST ADULT - HISTORY OF PRESENT ILLNESS
56 year old female   presents for PST prior to  Dilation & Curettage Hysteroscopy with Dr Garsia on 17 - pt notes she had stopped menses about 10 years ago however recently she developed some postmenopausal bleeding which lasted a few days. Pt notes she had Transvaginal Sonogram done in the office with endometrial biopsy attempted 2 times and pt notes "she couldn't get enough so I have to have this procedure." Pt is electing to scheduled procedure. 62 y/o female with PMH of ESRD (on dialysis on Tuesday, Thursday and Saturday), Type 2 DM and HTN here for PST. Pt seen by GYN and told she had "something inside my uterus which needs to be removed". Pt denies postmenopausal bleeding. Pt denies n/v and abdominal and pelvic pain. Pt electing for dilation and curettage hysteroscopy on 3/18/2022.

## 2022-03-07 NOTE — H&P PST ADULT - NSICDXPASTSURGICALHX_GEN_ALL_CORE_FT
PAST SURGICAL HISTORY:  H/O colonoscopy     H/O endoscopy     S/P arteriovenous (AV) fistula creation (2019)    S/P dilation and curettage (2017)    S/P laparoscopic cholecystectomy     S/P ovarian cystectomy (Age 30)

## 2022-03-07 NOTE — H&P PST ADULT - ATTENDING COMMENTS
I d/w Belgilbertoina that she did not take the Cytotec prescribed for her pre-operatively. This was being prescribed due to her hx of TCBA and cervical stenosis and as a means for facilitating gaining entry into the endometrial cavity.  I d/w Pamelaina that I may not be able to get into the uterine cavity due to scarring and our remaining options would be to monitor the lining for changes and/or proceed with Hysterectomy.

## 2022-03-07 NOTE — H&P PST ADULT - NSICDXFAMILYHX_GEN_ALL_CORE_FT
FAMILY HISTORY:  Mother  Still living? No  Family history of type 2 diabetes mellitus in mother, Age at diagnosis: Age Unknown

## 2022-03-07 NOTE — H&P PST ADULT - OTHER CARE PROVIDERS
Dr. Haris Figueroa (Cardiologist) Dr. Haris Figueroa (Cardiologist), Dr. Sylvester (Endocrinologist)

## 2022-03-07 NOTE — H&P PST ADULT - MUSCULOSKELETAL
SUBJECTIVE:                                                    Rebel Pena is a 11 year old female who presents to clinic today with mother, sibling and  because of:    Chief Complaint   Patient presents with     Derm Problem        HPI:  RASH    Problem started: 1 months ago  Location: face  Description: red, round, blotchy     Itching (Pruritis): YES  Recent illness or sore throat in last week: YES  Therapies Tried: Steroid cream  New exposures: Medication amoxicillin  Recent travel: no         Complaining of multiple small round itchy areas on upper cheeks.  Tried benzoyl peroxide and that didn't help.            ROS:  Negative for constitutional, eye, ear, nose, throat, skin, respiratory, cardiac, and gastrointestinal other than those outlined in the HPI.    PROBLEM LIST:  Patient Active Problem List    Diagnosis Date Noted     Sever's apophysitis 05/03/2014     Right heel.  Advised wearing shoes with good support, ibuprofen as needed.       BMI (body mass index), pediatric 95-99% for age, obese child structured weight management/multidisciplinary intervention category 05/25/2009     4/3/2014 Overweight-  Discussed at length drinking fluids with no calories inbetween meals, limiting snacks to a single piece of fruit after school, limiting screen time to a maximum of 2 hours a day, and to aim to get one hour of vigorous exercise in a day.      Diagnosis updated by automated process. Provider to review and confirm.        Ligamentous Laxity- knees and some mild intoeing 03/27/2009      MEDICATIONS:  Current Outpatient Prescriptions   Medication Sig Dispense Refill     amoxicillin (AMOXIL) 400 MG/5ML suspension Take 12.5 mLs (1,000 mg) by mouth daily for 10 days 125 mL 0      ALLERGIES:  No Known Allergies    Problem list and histories reviewed & adjusted, as indicated.    OBJECTIVE:                                                      /76 (BP Location: Right arm)  Pulse 79  Temp 97.6  F  "(36.4  C) (Oral)  Ht 5' 2.4\" (1.585 m)  Wt 155 lb 12.8 oz (70.7 kg)  BMI 28.13 kg/m2   Blood pressure percentiles are 44 % systolic and 87 % diastolic based on NHBPEP's 4th Report. Blood pressure percentile targets: 90: 121/78, 95: 125/82, 99 + 5 mmH/94.    GENERAL: Active, alert, in no acute distress.  SKIN: 3 circular pink scaly lesions below right eye with raised periphery.  One just below left eye.     DIAGNOSTICS: None    ASSESSMENT/PLAN:                                                    1. Tinea corporis  Will rx.    - clotrimazole (LOTRIMIN) 1 % cream; Apply topically 3 times daily Until better.  Dispense: 15 g; Refill: 1    FOLLOW UP:   Patient Instructions   Recheck if not better in 2 weeks.        Bravo Wilson MD    " details… ROM intact/no calf tenderness detailed exam No joint pain, swelling or deformity; no limitation of movement

## 2022-03-07 NOTE — H&P PST ADULT - PROBLEM SELECTOR PLAN 4
Medical clearance needed as per surgeon. CBC, Comprehensive panel, T&S, HgbA1c and EKG ordered. Pre-op instructions given and pt verbalized understanding. COVID19 PCR testing to be done within 72 hours of surgery at [facility].

## 2022-03-07 NOTE — H&P PST ADULT - ALLERGIC/IMMUNOLOGIC
Subjective:      Patient ID: Daysi Grimaldo is a 39 y.o. male  April Clarke MD  No ref. provider found    HPI  Chief Complaint   Patient presents with    Colonoscopy    Crohn's Disease       Patient with Crohn's disease diagnosed approximately 2001. Crohn's noted both large and small intestine. Due for screening colonoscopy. Last c-scope 12/2018. He had some mild chronic inflammation noted on biopsies that had not previously been seen. No active disease noted. Currently treated with Apriso 1.5 gm daily and Remicade infusion every 8 weeks. The patient denies abdominal or flank pain, anorexia, nausea or vomiting, dysphagia, change in bowel habits or black or bloody stools or weight loss. He denies any cough, fever, night sweats. He has joint pain and history of hip replacement. He has history vitamin D deficiency. Last labs done 2/2019, vitamin D 17  Discussed labs with patient. He is seen by Dr. Guera Pratt. He gets labs there regularly. I could not find a copy of a recent TB test.       Assessment:      1. Crohn's disease of both small and large intestine without complication ([facility] [address]    2. Vitamin D deficiency    3. History of colon polyps            Plan:      Labs per Dr. Guera Pratt. Request that copy of results be sent to our office. Annual TB testing for remicade therapy. Can check quantiferon gold TB when he gets labs done or needs tb skin testing. Patient is aware. No meds need refill today. Refill prn    Schedule colonoscopy    Instruct on bowel prep. Nothing to eat or drink after midnight the day of the exam.  Unable to drive for 24 hours after the procedure. No aspirin or nonsteroidal anti-inflammatories for 5 days before procedure. I have discussed the benefits, alternatives, and risks (including bleeding, perforation and death)  for pursuing Endoscopy (EGD/Colonscopy/EUS/ERCP) with the patient and they are willing to continue.  We also discussed the need for anesthesia, IV access, proper dietary changes, medication changes if necessary, and need for bowel prep (if ordered) prior to their Endoscopic procedure. They are aware they must have someone accompany them to their scheduled procedure to drive them home - they agree to the above and are willing to continue. Plan for anesthesia: MAC  no reported complications              Family History   Problem Relation Age of Onset    Diabetes Father     Lung Cancer Maternal Grandfather     Colon Cancer Neg Hx     Colon Polyps Neg Hx     Esophageal Cancer Neg Hx     Liver Cancer Neg Hx     Stomach Cancer Neg Hx     Liver Disease Neg Hx     Rectal Cancer Neg Hx        Past Medical History:   Diagnosis Date    Allergic rhinitis     AVN (avascular necrosis of bone) (HCC)     rt hip    Central sleep apnea     Colon polyp     CPAP (continuous positive airway pressure) dependence     Crohn's disease ([address]     GERD (gastroesophageal reflux disease)     Hypertension     hx. of htn    Insomnia     Obstructive sleep apnea     c pap    Restless leg syndrome        Past Surgical History:   Procedure Laterality Date    APPENDECTOMY      COLONOSCOPY  10/2/12    : old burned out ds, no active inflammation;     COLONOSCOPY  6/2004    history Crohn's, active left side colitis    COLONOSCOPY  1/2008    no active disease; hyperplastic polyp    COLONOSCOPY N/A 7/22/2016    Dr Yosi Garcia, (-) active colitis, 2 yr recall    COLONOSCOPY N/A 12/17/2018    Dr Brittney White-Hp x 2, no active colitis--1 yr recall    ENDOSCOPY, COLON, DIAGNOSTIC      TOTAL HIP ARTHROPLASTY Right 7/9/2019    RIGHT TOTAL HIP REPLACEMENT performed by Dewayne Mccall MD at [facility] ENDOSCOPY  2003?            Current Outpatient Medications   Medication Sig Dispense Refill    aspirin EC 81 MG EC tablet Take 1 tablet by mouth 2 times daily 60 tablet 0    ibuprofen (ADVIL;MOTRIN) 400 MG tablet Take 1 tablet by negative

## 2022-03-07 NOTE — H&P PST ADULT - PROBLEM SELECTOR PLAN 2
STAT BMP on admit morning of surgery. Pt to stop Eliquis on 3/15/22 as per cardiologist. Cardiac clearance needed and in chart.

## 2022-03-07 NOTE — H&P PST ADULT - FALL HARM RISK - UNIVERSAL INTERVENTIONS
Bed in lowest position, wheels locked, appropriate side rails in place/Call bell, personal items and telephone in reach/Instruct patient to call for assistance before getting out of bed or chair/Non-slip footwear when patient is out of bed/[place] to call system/Physically safe environment - no spills, clutter or unnecessary equipment/Purposeful Proactive Rounding/Room/bathroom lighting operational, light cord in reach

## 2022-03-07 NOTE — H&P PST ADULT - NSICDXPASTMEDICALHX_GEN_ALL_CORE_FT
PAST MEDICAL HISTORY:  ESRD on dialysis     Hypertension     Type 2 diabetes mellitus      PAST MEDICAL HISTORY:  Atrial fibrillation     ESRD on dialysis (Tuesday, Thusday and Saturday)    GERD (gastroesophageal reflux disease)     Hypertension     Polyp of corpus uteri     Type 2 diabetes mellitus

## 2022-03-07 NOTE — H&P PST ADULT - PROBLEM SELECTOR PLAN 3
Medical clearance needed as per surgeon. CBC, Comprehensive panel, T&S, HgbA1c and EKG ordered. Pre-op instructions given and pt verbalized understanding. COVID19 PCR testing to be done within 72 hours of surgery at [facility]. STAT BMP on admit morning of surgery.

## 2022-03-07 NOTE — H&P PST ADULT - PROBLEM SELECTOR PLAN 1
Instructed pt of need for endocrine clearance if HgbA1c is 9 or greater. Hold Jardiance on 3/15/22. No Glimepiride morning of surgery. Pt verbalized understanding.

## 2022-03-07 NOTE — H&P PST ADULT - ASSESSMENT
56 year old female with Postmenopausal Bleeding and Endometrial Hyperplasia, Unspecified - scheduled for Dilation & Curettage Hysteroscopy with Dr Garsia on 4/28/17 62 y/o female with polyp of corpus uteri.

## 2022-03-08 LAB
A1C WITH ESTIMATED AVERAGE GLUCOSE RESULT: 8 % — HIGH (ref 4–5.6)
ESTIMATED AVERAGE GLUCOSE: 183 MG/DL — HIGH (ref 68–114)

## 2022-03-15 PROBLEM — I48.91 UNSPECIFIED ATRIAL FIBRILLATION: Chronic | Status: ACTIVE | Noted: 2022-03-07

## 2022-03-15 PROBLEM — N18.6 END STAGE RENAL DISEASE: Chronic | Status: ACTIVE | Noted: 2022-03-07

## 2022-03-15 PROBLEM — N84.0 POLYP OF CORPUS UTERI: Chronic | Status: ACTIVE | Noted: 2022-03-07

## 2022-03-15 PROBLEM — K21.9 GASTRO-ESOPHAGEAL REFLUX DISEASE WITHOUT ESOPHAGITIS: Chronic | Status: ACTIVE | Noted: 2022-03-07

## 2022-03-16 ENCOUNTER — OUTPATIENT (OUTPATIENT)
Dept: OUTPATIENT SERVICES | Facility: HOSPITAL | Age: 62
LOS: 1 days | End: 2022-03-16
Payer: MEDICARE

## 2022-03-16 DIAGNOSIS — Z98.890 OTHER SPECIFIED POSTPROCEDURAL STATES: Chronic | ICD-10-CM

## 2022-03-16 DIAGNOSIS — Z98.89 OTHER SPECIFIED POSTPROCEDURAL STATES: Chronic | ICD-10-CM

## 2022-03-16 DIAGNOSIS — Z90.49 ACQUIRED ABSENCE OF OTHER SPECIFIED PARTS OF DIGESTIVE TRACT: Chronic | ICD-10-CM

## 2022-03-16 DIAGNOSIS — Z20.828 CONTACT WITH AND (SUSPECTED) EXPOSURE TO OTHER VIRAL COMMUNICABLE DISEASES: ICD-10-CM

## 2022-03-16 LAB — SARS-COV-2 RNA SPEC QL NAA+PROBE: SIGNIFICANT CHANGE UP

## 2022-03-16 PROCEDURE — U0005: CPT

## 2022-03-16 PROCEDURE — U0003: CPT

## 2022-03-17 ENCOUNTER — TRANSCRIPTION ENCOUNTER (OUTPATIENT)
Age: 62
End: 2022-03-17

## 2022-03-17 NOTE — ASU PATIENT PROFILE, ADULT - FALL HARM RISK - UNIVERSAL INTERVENTIONS
Bed in lowest position, wheels locked, appropriate side rails in place/Call bell, personal items and telephone in reach/Instruct patient to call for assistance before getting out of bed or chair/Non-slip footwear when patient is out of bed/[place] to call system/Physically safe environment - no spills, clutter or unnecessary equipment/Purposeful Proactive Rounding/Room/bathroom lighting operational, light cord in reach

## 2022-03-17 NOTE — ASU PATIENT PROFILE, ADULT - NSICDXPASTMEDICALHX_GEN_ALL_CORE_FT
PAST MEDICAL HISTORY:  Atrial fibrillation     ESRD on dialysis (Tuesday, Thusday and Saturday)    GERD (gastroesophageal reflux disease)     Hypertension     Polyp of corpus uteri     Type 2 diabetes mellitus

## 2022-03-18 ENCOUNTER — OUTPATIENT (OUTPATIENT)
Dept: OUTPATIENT SERVICES | Facility: HOSPITAL | Age: 62
LOS: 1 days | End: 2022-03-18
Payer: MEDICARE

## 2022-03-18 ENCOUNTER — RESULT REVIEW (OUTPATIENT)
Age: 62
End: 2022-03-18

## 2022-03-18 VITALS
DIASTOLIC BLOOD PRESSURE: 54 MMHG | HEART RATE: 67 BPM | SYSTOLIC BLOOD PRESSURE: 135 MMHG | OXYGEN SATURATION: 97 % | RESPIRATION RATE: 16 BRPM

## 2022-03-18 VITALS
TEMPERATURE: 98 F | WEIGHT: 250 LBS | OXYGEN SATURATION: 100 % | RESPIRATION RATE: 16 BRPM | SYSTOLIC BLOOD PRESSURE: 141 MMHG | HEART RATE: 73 BPM | DIASTOLIC BLOOD PRESSURE: 53 MMHG

## 2022-03-18 DIAGNOSIS — Z98.890 OTHER SPECIFIED POSTPROCEDURAL STATES: Chronic | ICD-10-CM

## 2022-03-18 DIAGNOSIS — R93.5 ABNORMAL FINDINGS ON DIAGNOSTIC IMAGING OF OTHER ABDOMINAL REGIONS, INCLUDING RETROPERITONEUM: ICD-10-CM

## 2022-03-18 DIAGNOSIS — Z98.89 OTHER SPECIFIED POSTPROCEDURAL STATES: Chronic | ICD-10-CM

## 2022-03-18 DIAGNOSIS — Z90.49 ACQUIRED ABSENCE OF OTHER SPECIFIED PARTS OF DIGESTIVE TRACT: Chronic | ICD-10-CM

## 2022-03-18 DIAGNOSIS — N84.0 POLYP OF CORPUS UTERI: ICD-10-CM

## 2022-03-18 DIAGNOSIS — Z01.818 ENCOUNTER FOR OTHER PREPROCEDURAL EXAMINATION: ICD-10-CM

## 2022-03-18 LAB
ANION GAP SERPL CALC-SCNC: 7 MMOL/L — SIGNIFICANT CHANGE UP (ref 5–17)
BUN SERPL-MCNC: 44 MG/DL — HIGH (ref 7–23)
CALCIUM SERPL-MCNC: 9.7 MG/DL — SIGNIFICANT CHANGE UP (ref 8.5–10.1)
CHLORIDE SERPL-SCNC: 105 MMOL/L — SIGNIFICANT CHANGE UP (ref 96–108)
CO2 SERPL-SCNC: 28 MMOL/L — SIGNIFICANT CHANGE UP (ref 22–31)
CREAT SERPL-MCNC: 5.9 MG/DL — HIGH (ref 0.5–1.3)
EGFR: 8 ML/MIN/1.73M2 — LOW
GLUCOSE SERPL-MCNC: 160 MG/DL — HIGH (ref 70–99)
POTASSIUM SERPL-MCNC: 5.4 MMOL/L — HIGH (ref 3.5–5.3)
POTASSIUM SERPL-SCNC: 5.4 MMOL/L — HIGH (ref 3.5–5.3)
SODIUM SERPL-SCNC: 140 MMOL/L — SIGNIFICANT CHANGE UP (ref 135–145)

## 2022-03-18 PROCEDURE — 88305 TISSUE EXAM BY PATHOLOGIST: CPT | Mod: 26

## 2022-03-18 PROCEDURE — 82962 GLUCOSE BLOOD TEST: CPT

## 2022-03-18 PROCEDURE — 80048 BASIC METABOLIC PNL TOTAL CA: CPT

## 2022-03-18 PROCEDURE — 88305 TISSUE EXAM BY PATHOLOGIST: CPT

## 2022-03-18 PROCEDURE — 36415 COLL VENOUS BLD VENIPUNCTURE: CPT

## 2022-03-18 PROCEDURE — 58558 HYSTEROSCOPY BIOPSY: CPT

## 2022-03-18 RX ORDER — SODIUM CHLORIDE 9 MG/ML
1000 INJECTION INTRAMUSCULAR; INTRAVENOUS; SUBCUTANEOUS
Refills: 0 | Status: DISCONTINUED | OUTPATIENT
Start: 2022-03-18 | End: 2022-03-18

## 2022-03-18 RX ORDER — HYDROMORPHONE HYDROCHLORIDE 2 MG/ML
0.5 INJECTION INTRAMUSCULAR; INTRAVENOUS; SUBCUTANEOUS
Refills: 0 | Status: DISCONTINUED | OUTPATIENT
Start: 2022-03-18 | End: 2022-03-18

## 2022-03-18 RX ORDER — AMLODIPINE BESYLATE 2.5 MG/1
1 TABLET ORAL
Qty: 0 | Refills: 0 | DISCHARGE

## 2022-03-18 RX ORDER — DILTIAZEM HCL 120 MG
0 CAPSULE, EXT RELEASE 24 HR ORAL
Qty: 0 | Refills: 0 | DISCHARGE

## 2022-03-18 RX ORDER — FAMOTIDINE 10 MG/ML
1 INJECTION INTRAVENOUS
Qty: 0 | Refills: 0 | DISCHARGE

## 2022-03-18 RX ORDER — OXYCODONE HYDROCHLORIDE 5 MG/1
10 TABLET ORAL EVERY 4 HOURS
Refills: 0 | Status: DISCONTINUED | OUTPATIENT
Start: 2022-03-18 | End: 2022-03-18

## 2022-03-18 RX ORDER — SIMETHICONE 80 MG/1
80 TABLET, CHEWABLE ORAL EVERY 6 HOURS
Refills: 0 | Status: DISCONTINUED | OUTPATIENT
Start: 2022-03-18 | End: 2022-04-01

## 2022-03-18 RX ORDER — ACETAMINOPHEN 500 MG
1000 TABLET ORAL ONCE
Refills: 0 | Status: COMPLETED | OUTPATIENT
Start: 2022-03-18 | End: 2022-03-18

## 2022-03-18 RX ORDER — GLIMEPIRIDE 1 MG
0 TABLET ORAL
Qty: 0 | Refills: 0 | DISCHARGE

## 2022-03-18 RX ORDER — CALCIUM ACETATE 667 MG
0 TABLET ORAL
Qty: 0 | Refills: 0 | DISCHARGE

## 2022-03-18 RX ORDER — CARVEDILOL PHOSPHATE 80 MG/1
0 CAPSULE, EXTENDED RELEASE ORAL
Qty: 0 | Refills: 0 | DISCHARGE

## 2022-03-18 RX ORDER — SODIUM CHLORIDE 9 MG/ML
1000 INJECTION, SOLUTION INTRAVENOUS
Refills: 0 | Status: DISCONTINUED | OUTPATIENT
Start: 2022-03-18 | End: 2022-03-18

## 2022-03-18 RX ORDER — EMPAGLIFLOZIN 10 MG/1
0 TABLET, FILM COATED ORAL
Qty: 0 | Refills: 0 | DISCHARGE

## 2022-03-18 RX ORDER — SEVELAMER CARBONATE 2400 MG/1
0 POWDER, FOR SUSPENSION ORAL
Qty: 0 | Refills: 0 | DISCHARGE

## 2022-03-18 RX ORDER — APIXABAN 2.5 MG/1
0 TABLET, FILM COATED ORAL
Qty: 0 | Refills: 0 | DISCHARGE

## 2022-03-18 RX ORDER — PATIROMER 8.4 G/1
0 POWDER, FOR SUSPENSION ORAL
Qty: 0 | Refills: 3 | DISCHARGE

## 2022-03-18 RX ORDER — OXYCODONE HYDROCHLORIDE 5 MG/1
5 TABLET ORAL EVERY 4 HOURS
Refills: 0 | Status: DISCONTINUED | OUTPATIENT
Start: 2022-03-18 | End: 2022-03-18

## 2022-03-18 RX ORDER — ONDANSETRON 8 MG/1
8 TABLET, FILM COATED ORAL EVERY 8 HOURS
Refills: 0 | Status: DISCONTINUED | OUTPATIENT
Start: 2022-03-18 | End: 2022-04-01

## 2022-03-18 RX ORDER — ONDANSETRON 8 MG/1
4 TABLET, FILM COATED ORAL ONCE
Refills: 0 | Status: DISCONTINUED | OUTPATIENT
Start: 2022-03-18 | End: 2022-03-18

## 2022-03-18 RX ORDER — SODIUM CHLORIDE 9 MG/ML
3 INJECTION INTRAMUSCULAR; INTRAVENOUS; SUBCUTANEOUS EVERY 8 HOURS
Refills: 0 | Status: DISCONTINUED | OUTPATIENT
Start: 2022-03-18 | End: 2022-04-01

## 2022-03-18 RX ADMIN — Medication 400 MILLIGRAM(S): at 12:33

## 2022-03-18 RX ADMIN — SODIUM CHLORIDE 25 MILLILITER(S): 9 INJECTION INTRAMUSCULAR; INTRAVENOUS; SUBCUTANEOUS at 10:28

## 2022-03-18 NOTE — BRIEF OPERATIVE NOTE - NSICDXBRIEFPOSTOP_GEN_ALL_CORE_FT
POST-OP DIAGNOSIS:  Cervical os stenosis 18-Mar-2022 11:14:03  Jose R Patel  Endometrial polyp 18-Mar-2022 11:14:22  Jose R Patel  Hydrometra 18-Mar-2022 11:14:38  Jose R Patel   complains of pain/discomfort

## 2022-03-18 NOTE — BRIEF OPERATIVE NOTE - NSICDXBRIEFPROCEDURE_GEN_ALL_CORE_FT
PROCEDURES:  Hysteroscopy with dilation and curettage of uterus 18-Mar-2022 11:12:26  Jose R Patel  Hysteroscopy, with endometrial sampling and polypectomy 18-Mar-2022 11:12:44  Jose R Patel

## 2022-03-18 NOTE — BRIEF OPERATIVE NOTE - NSICDXBRIEFPREOP_GEN_ALL_CORE_FT
PRE-OP DIAGNOSIS:  Thickened endometrium 18-Mar-2022 11:12:58  Jose R Patel  Endometrial polyp 18-Mar-2022 11:13:08  Jose R Patel  Hydrometra 18-Mar-2022 11:13:23  Jose R Patel  Cervical os stenosis 18-Mar-2022 11:13:38  Jose R Patel

## 2022-03-18 NOTE — ASU DISCHARGE PLAN (ADULT/PEDIATRIC) - CARE PROVIDER_API CALL
Jose R Patel)  Obstetrics and Gynecology  [address] [place] [address]  Phone: [phone]  Fax: [phone]  Follow Up Time:

## 2022-03-18 NOTE — ASU DISCHARGE PLAN (ADULT/PEDIATRIC) - NS MD DC FALL RISK RISK
For information on Fall & Injury Prevention, visit: [email or web address] OR  [email or web address] OR  https://www.cdc.gov/steadi/patient.html

## 2022-03-21 LAB — SURGICAL PATHOLOGY STUDY: SIGNIFICANT CHANGE UP

## 2022-10-27 NOTE — H&P PST ADULT - PROBLEM SELECTOR PLAN 1
PST Labs; CBC, CMP, HgB A1C, Type & Screen, EKG - Medical clearance with Dr Sylvseter - pre-op instructions given to pt with understanding verbalized 4 = No assist / stand by assistance

## 2022-11-16 NOTE — CONSULT NOTE ADULT - PROBLEM SELECTOR RECOMMENDATION 9
start lantus 10 units qam  cont mod dose humalog scale  oral anti-diabetes agents on hold  ada diet; goal 100-180 no

## 2023-03-23 NOTE — H&P PST ADULT - NS PRO LAST MENSTRUAL DATE
Next visit: 3/28/2023   Last visit: 10/12/2021    Refill Requested / Last Refill Info:  Disp Refills Start End     QUEtiapine (SEROquel) 25 MG tablet 60 tablet 0 3/21/2023     Sig - Route: Take 1 tablet by mouth in the morning and 1 tablet in the evening. - Oral      Refill unable to be completed per standing protocol due to: Non-Protocol Medication    Orders pended, and routed to provider for approval.   Please route any notes back to your nursing pool via patient call NOT Rx Auth.  Thank you, Refill Center Staff   Post Menopausal Bleeding Noted march 2017

## 2023-06-26 NOTE — ED ADULT NURSE NOTE - CHPI ED NUR DURATION
Last visit: 4/5/23  Next visit: 7/6/23    Last labs   GOT/AST (Units/L)   Date Value   04/04/2023 14     GPT/ALT (Units/L)   Date Value   04/04/2023 25     Creatinine (mg/dL)   Date Value   04/04/2023 0.55     PLT (K/mcL)   Date Value   04/04/2023 220     Absolute Neutrophils (K/mcL)   Date Value   04/04/2023 4.5     WBC (K/mcL)   Date Value   04/04/2023 6.8     Quantiferon Plus Interpretation (no units)   Date Value   10/20/2022 Negative         CE checked:   Recent labs in CareEverywhere:    Forwarded to prescribing physician for signature.   yesterday

## 2023-12-06 NOTE — H&P ADULT - MUSCULOSKELETAL COMMENTS
"Patient: Risa Olvera  MRN: [id number]    Pre-sedation Evaluation:  Sedation necessary for: Analgesia  Requesting service: GI    History of Present Illness:     Risa Olvera is a 68 y.o. female with a history of HTN, obesity (Body mass index is 31.97 kg/m².), chronic pain, HLD, and tobacco use who presents for OPEN ACCESS colonoscopy requested by her PCP for the purposes of screening.    She reportedly had a colonoscopy in 2013 that she thinks was normal, but polyps may have been seen in the past. There is no family history of colorectal cancer.         Past Medical History:   Diagnosis Date    GERD (gastroesophageal reflux disease)     Hypertension        Principle problems:  There are no problems to display for this patient.    Allergies:  Allergies   Allergen Reactions    Gabapentin Other     Made her feel funny    Hydrocodone-Acetaminophen Other     Feels \"funny\".  No problem with Percocet    Other reaction(s): stomach upset    Tapentadol GI Upset    Venlafaxine Hcl Other     Dizzy and light headed.     Dizzy and light headed.     PTA/Current Medications:  (Not in a hospital admission)    Current Outpatient Medications   Medication Sig Dispense Refill    lisinopriL-hydrochlorothiazide 20-25 mg tablet Take 1 tablet by mouth once daily.      omeprazole OTC (PriLOSEC OTC) 20 mg EC tablet Take 1 tablet (20 mg) by mouth once daily in the morning. Take before meals. Do not crush, chew, or split.      tirzepatide (Mounjaro) 10 mg/0.5 mL pen injector Inject 10 mg under the skin every 7 days.      oxyCODONE-acetaminophen (Percocet) 7.5-325 mg tablet Take 1 tablet by mouth 2 times a day as needed for severe pain (7 - 10).       No current facility-administered medications for this encounter.     Past Surgical History:   has no past surgical history on file.    Recent sedation/surgery (24 hours) No    Review of Systems:  Please check all that apply: Obesity    Pregnancy test completed prior to procedure " on any menstruating female: none        NPO guidelines met: Yes    Physical Exam    Airway  Mallampati: III  Neck ROM: full  Comments: Normal mouth opening.   Cardiovascular   Rhythm: regular  Rate: normal  (-) murmur     Dental    Pulmonary   Breath sounds clear to auscultation  (-) wheezes       Other findings: Abdomen is soft, nontender, and not distended.    Patient is calm appearing and in no apparent distress.    Patient is awake and alert and oriented x4.      Plan    ASA 2     Moderate   (Sedation medications to be delivered via monitored anesthesia care (MAC).    This evaluation serves as my H&P.    Outpatient medication list and allergies have been reviewed.  Pre-procedure/radha procedure antibiotics not needed.    Pre-procedure evaluation completed by physician.    Surgeon has reviewed key risks related to the risk of jimy COVID-19 and if they contract COVID-19 what the risks are.)       right great toe wound

## 2024-05-20 NOTE — DISCHARGE NOTE ADULT - PATIENT PORTAL LINK FT
“You can access the FollowHealth Patient Portal, offered by [facility], by registering with the following website: [email or web address]” PRE-OP DIAGNOSIS:  Stress incontinence 20-May-2024 13:42:06  Burke Montiel  Prolapsed uterus 20-May-2024 13:41:57  Burke Montiel

## 2024-12-02 NOTE — ED PROVIDER NOTE - PRINCIPAL DIAGNOSIS
- Start low dose aspirin (81 mg every day)  - Report if any bleeding occur  - Follow CBC in 6 months Abdominal pain

## 2025-01-27 NOTE — ASU PATIENT PROFILE, ADULT - AS SC BRADEN NUTRITION
Patient's  shows they are overdue for Hemoglobin A1C  Care Everywhere and  files searched.   updated with 8/28/24 A1C result.    (3) adequate

## (undated) DEVICE — SOL INJ LR 1000ML

## (undated) DEVICE — DRAPE TOWEL BLUE 17" X 24"

## (undated) DEVICE — CONTAINER SPECIMEN 4OZ

## (undated) DEVICE — CANISTER SUCTION 2000CC

## (undated) DEVICE — PREP KIT SKIN PREP DRY

## (undated) DEVICE — TUBING GYRUS ACMI COLLECTION SET 6FT

## (undated) DEVICE — SOL IRR POUR H2O 1000ML

## (undated) DEVICE — VENODYNE/SCD SLEEVE CALF MEDIUM

## (undated) DEVICE — VACUUM CURETTE BUSSE HOSP CURVED 7MM

## (undated) DEVICE — VENODYNE/SCD SLEEVE CALF LARGE

## (undated) DEVICE — DRAPE LIGHT HANDLE COVER (GREEN)

## (undated) DEVICE — PACK LITHOTOMY

## (undated) DEVICE — TUBING TUR 2 PRONG

## (undated) DEVICE — PSP-SCD MACHINE: Type: DURABLE MEDICAL EQUIPMENT

## (undated) DEVICE — WARMING BLANKET UPPER ADULT

## (undated) DEVICE — GLV 7.5 PROTEXIS ORTHO (CREAM)

## (undated) DEVICE — DRAPE MAYO STAND 23"